# Patient Record
Sex: MALE | Race: WHITE | NOT HISPANIC OR LATINO | Employment: UNEMPLOYED | URBAN - METROPOLITAN AREA
[De-identification: names, ages, dates, MRNs, and addresses within clinical notes are randomized per-mention and may not be internally consistent; named-entity substitution may affect disease eponyms.]

---

## 2023-02-15 PROBLEM — E03.9 ACQUIRED HYPOTHYROIDISM: Status: ACTIVE | Noted: 2023-02-15

## 2023-02-15 PROBLEM — I48.92 ATRIAL FLUTTER (MULTI): Status: ACTIVE | Noted: 2023-02-15

## 2023-02-15 PROBLEM — G47.30 SLEEP APNEA IN ADULT: Status: ACTIVE | Noted: 2023-02-15

## 2023-02-15 PROBLEM — J06.9 ACUTE URI: Status: ACTIVE | Noted: 2023-02-15

## 2023-02-15 PROBLEM — E78.00 HYPERCHOLESTEROLEMIA: Status: ACTIVE | Noted: 2023-02-15

## 2023-02-15 PROBLEM — R06.89 DECREASED BREATH SOUNDS AT LEFT LUNG BASE: Status: ACTIVE | Noted: 2023-02-15

## 2023-02-15 PROBLEM — R35.1 NOCTURIA MORE THAN TWICE PER NIGHT: Status: ACTIVE | Noted: 2023-02-15

## 2023-02-15 PROBLEM — I10 ESSENTIAL HYPERTENSION WITH GOAL BLOOD PRESSURE LESS THAN 130/80: Status: ACTIVE | Noted: 2023-02-15

## 2023-02-15 PROBLEM — M21.612 BUNION OF GREAT TOE OF LEFT FOOT: Status: ACTIVE | Noted: 2023-02-15

## 2023-02-15 PROBLEM — I25.10 CORONARY ARTERY DISEASE INVOLVING NATIVE CORONARY ARTERY OF NATIVE HEART WITHOUT ANGINA PECTORIS: Status: ACTIVE | Noted: 2023-02-15

## 2023-02-15 PROBLEM — Z95.1 S/P CABG X 2: Status: ACTIVE | Noted: 2023-02-15

## 2023-02-15 PROBLEM — R06.83 SNORING: Status: ACTIVE | Noted: 2023-02-15

## 2023-02-15 PROBLEM — T14.90XA TRAUMA, BLUNT: Status: ACTIVE | Noted: 2023-02-15

## 2023-02-15 PROBLEM — R06.81 WITNESSED EPISODE OF APNEA: Status: ACTIVE | Noted: 2023-02-15

## 2023-02-15 PROBLEM — I21.4 NON-Q WAVE NON-ST ELEVATION MYOCARDIAL INFARCTION (NSTEMI) (MULTI): Status: ACTIVE | Noted: 2023-02-15

## 2023-02-15 PROBLEM — G47.33 OSA (OBSTRUCTIVE SLEEP APNEA): Status: ACTIVE | Noted: 2023-02-15

## 2023-02-15 PROBLEM — R97.20 ELEVATED PSA: Status: ACTIVE | Noted: 2023-02-15

## 2023-02-15 PROBLEM — K63.5 COLON POLYPS: Status: ACTIVE | Noted: 2023-02-15

## 2023-02-15 PROBLEM — N52.9 MALE ERECTILE DISORDER OF ORGANIC ORIGIN: Status: ACTIVE | Noted: 2023-02-15

## 2023-02-15 PROBLEM — I48.91 A-FIB (MULTI): Status: ACTIVE | Noted: 2023-02-15

## 2023-02-15 PROBLEM — I42.2 APICAL VARIANT HYPERTROPHIC CARDIOMYOPATHY (MULTI): Status: ACTIVE | Noted: 2023-02-15

## 2023-02-15 RX ORDER — MULTIVITAMIN
1 TABLET ORAL DAILY
COMMUNITY

## 2023-02-15 RX ORDER — LEVOTHYROXINE SODIUM 50 UG/1
1 TABLET ORAL DAILY
COMMUNITY
Start: 2020-11-18 | End: 2023-04-24

## 2023-02-15 RX ORDER — NITROGLYCERIN 0.4 MG/1
0.4 TABLET SUBLINGUAL EVERY 5 MIN PRN
COMMUNITY

## 2023-02-15 RX ORDER — ROSUVASTATIN CALCIUM 40 MG/1
1 TABLET, COATED ORAL DAILY
COMMUNITY
Start: 2014-06-04 | End: 2023-07-20 | Stop reason: WASHOUT

## 2023-02-15 RX ORDER — METOPROLOL TARTRATE 100 MG/1
1 TABLET ORAL 2 TIMES DAILY
COMMUNITY
Start: 2021-06-09

## 2023-03-27 ENCOUNTER — TELEPHONE (OUTPATIENT)
Dept: PRIMARY CARE | Facility: CLINIC | Age: 68
End: 2023-03-27
Payer: COMMERCIAL

## 2023-03-28 ENCOUNTER — APPOINTMENT (OUTPATIENT)
Dept: PRIMARY CARE | Facility: CLINIC | Age: 68
End: 2023-03-28
Payer: COMMERCIAL

## 2023-03-30 ENCOUNTER — PATIENT OUTREACH (OUTPATIENT)
Dept: CARE COORDINATION | Facility: CLINIC | Age: 68
End: 2023-03-30
Payer: COMMERCIAL

## 2023-03-30 ENCOUNTER — DOCUMENTATION (OUTPATIENT)
Dept: CARE COORDINATION | Facility: CLINIC | Age: 68
End: 2023-03-30
Payer: COMMERCIAL

## 2023-04-22 DIAGNOSIS — E03.9 ACQUIRED HYPOTHYROIDISM: Primary | ICD-10-CM

## 2023-04-24 RX ORDER — LEVOTHYROXINE SODIUM 50 UG/1
TABLET ORAL
Qty: 90 TABLET | Refills: 1 | Status: SHIPPED | OUTPATIENT
Start: 2023-04-24 | End: 2023-12-04

## 2023-07-19 PROBLEM — Z20.822 CONTACT WITH AND (SUSPECTED) EXPOSURE TO COVID-19: Status: ACTIVE | Noted: 2023-03-27

## 2023-07-19 PROBLEM — J45.20 MILD INTERMITTENT ASTHMA (HHS-HCC): Status: ACTIVE | Noted: 2023-07-19

## 2023-07-19 PROBLEM — G89.29 CHRONIC PAIN OF RIGHT KNEE: Status: ACTIVE | Noted: 2023-04-27

## 2023-07-19 PROBLEM — R07.9 ACUTE CHEST PAIN: Status: ACTIVE | Noted: 2023-03-27

## 2023-07-19 PROBLEM — M10.9 GOUTY ARTHRITIS OF LEFT FOOT: Status: ACTIVE | Noted: 2023-07-19

## 2023-07-19 PROBLEM — M25.561 CHRONIC PAIN OF RIGHT KNEE: Status: ACTIVE | Noted: 2023-04-27

## 2023-07-19 PROBLEM — M17.11 PRIMARY OSTEOARTHRITIS OF RIGHT KNEE: Status: ACTIVE | Noted: 2023-04-27

## 2023-07-19 PROBLEM — G80.9 CEREBRAL PALSY (MULTI): Status: ACTIVE | Noted: 2023-07-19

## 2023-07-19 PROBLEM — R53.83 FATIGUE: Status: ACTIVE | Noted: 2023-07-19

## 2023-07-19 PROBLEM — S83.231A COMPLEX TEAR OF MEDIAL MENISCUS OF RIGHT KNEE AS CURRENT INJURY: Status: ACTIVE | Noted: 2023-04-27

## 2023-07-19 PROBLEM — S83.231 COMPLEX TEAR OF MEDIAL MENISCUS OF RIGHT KNEE AS CURRENT INJURY: Status: ACTIVE | Noted: 2023-04-27

## 2023-07-19 RX ORDER — ATORVASTATIN CALCIUM 40 MG/1
40 TABLET, FILM COATED ORAL
COMMUNITY
Start: 2016-04-04 | End: 2023-10-18 | Stop reason: ALTCHOICE

## 2023-07-19 RX ORDER — IRBESARTAN AND HYDROCHLOROTHIAZIDE 150; 12.5 MG/1; MG/1
TABLET, FILM COATED ORAL
COMMUNITY
Start: 2017-08-16 | End: 2023-07-20 | Stop reason: WASHOUT

## 2023-07-19 RX ORDER — SILDENAFIL CITRATE 20 MG/1
TABLET ORAL
COMMUNITY
Start: 2017-08-16

## 2023-07-19 RX ORDER — ACETAMINOPHEN 500 MG
500 TABLET ORAL EVERY 6 HOURS PRN
COMMUNITY
Start: 2023-02-02 | End: 2023-07-20 | Stop reason: ALTCHOICE

## 2023-07-20 ENCOUNTER — OFFICE VISIT (OUTPATIENT)
Dept: PRIMARY CARE | Facility: CLINIC | Age: 68
End: 2023-07-20
Payer: COMMERCIAL

## 2023-07-20 ENCOUNTER — LAB (OUTPATIENT)
Dept: LAB | Facility: LAB | Age: 68
End: 2023-07-20
Payer: COMMERCIAL

## 2023-07-20 VITALS
BODY MASS INDEX: 31.07 KG/M2 | OXYGEN SATURATION: 96 % | DIASTOLIC BLOOD PRESSURE: 68 MMHG | SYSTOLIC BLOOD PRESSURE: 114 MMHG | HEIGHT: 69 IN | HEART RATE: 56 BPM | WEIGHT: 209.8 LBS

## 2023-07-20 DIAGNOSIS — R60.0 LOWER EXTREMITY EDEMA: Primary | ICD-10-CM

## 2023-07-20 DIAGNOSIS — R60.0 LOWER EXTREMITY EDEMA: ICD-10-CM

## 2023-07-20 PROCEDURE — 85025 COMPLETE CBC W/AUTO DIFF WBC: CPT

## 2023-07-20 PROCEDURE — 3074F SYST BP LT 130 MM HG: CPT | Performed by: NURSE PRACTITIONER

## 2023-07-20 PROCEDURE — 1159F MED LIST DOCD IN RCRD: CPT | Performed by: NURSE PRACTITIONER

## 2023-07-20 PROCEDURE — 1036F TOBACCO NON-USER: CPT | Performed by: NURSE PRACTITIONER

## 2023-07-20 PROCEDURE — 1126F AMNT PAIN NOTED NONE PRSNT: CPT | Performed by: NURSE PRACTITIONER

## 2023-07-20 PROCEDURE — 85379 FIBRIN DEGRADATION QUANT: CPT

## 2023-07-20 PROCEDURE — 36415 COLL VENOUS BLD VENIPUNCTURE: CPT

## 2023-07-20 PROCEDURE — 3078F DIAST BP <80 MM HG: CPT | Performed by: NURSE PRACTITIONER

## 2023-07-20 PROCEDURE — 83880 ASSAY OF NATRIURETIC PEPTIDE: CPT

## 2023-07-20 PROCEDURE — 80053 COMPREHEN METABOLIC PANEL: CPT

## 2023-07-20 PROCEDURE — 1160F RVW MEDS BY RX/DR IN RCRD: CPT | Performed by: NURSE PRACTITIONER

## 2023-07-20 PROCEDURE — 99213 OFFICE O/P EST LOW 20 MIN: CPT | Performed by: NURSE PRACTITIONER

## 2023-07-20 ASSESSMENT — PAIN SCALES - GENERAL: PAINLEVEL: 0-NO PAIN

## 2023-07-20 ASSESSMENT — ENCOUNTER SYMPTOMS
RESPIRATORY NEGATIVE: 1
CARDIOVASCULAR NEGATIVE: 1
JOINT SWELLING: 0
ARTHRALGIAS: 1
MYALGIAS: 0
GASTROINTESTINAL NEGATIVE: 1
CONSTITUTIONAL NEGATIVE: 1

## 2023-07-20 NOTE — PATIENT INSTRUCTIONS
"Increase fluid- water 64 + ounces a day  Limit sodium  Labs complete today-> let you know results and if normal will sent in small dose of water pill to take \"as needed\" to help decrease swelling  Elevated legs when possible, avoid standing in one place without exercising muscles of legs.   US schedule to rule out possible DVT schedule soon.     "
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
## 2023-07-21 DIAGNOSIS — R79.89 ELEVATED BRAIN NATRIURETIC PEPTIDE (BNP) LEVEL: ICD-10-CM

## 2023-07-21 DIAGNOSIS — R60.0 LOWER EXTREMITY EDEMA: Primary | ICD-10-CM

## 2023-07-21 LAB
ALANINE AMINOTRANSFERASE (SGPT) (U/L) IN SER/PLAS: 26 U/L (ref 10–52)
ALBUMIN (G/DL) IN SER/PLAS: 4.5 G/DL (ref 3.4–5)
ALKALINE PHOSPHATASE (U/L) IN SER/PLAS: 45 U/L (ref 33–136)
ANION GAP IN SER/PLAS: 13 MMOL/L (ref 10–20)
ASPARTATE AMINOTRANSFERASE (SGOT) (U/L) IN SER/PLAS: 25 U/L (ref 9–39)
BASOPHILS (10*3/UL) IN BLOOD BY AUTOMATED COUNT: 0.04 X10E9/L (ref 0–0.1)
BASOPHILS/100 LEUKOCYTES IN BLOOD BY AUTOMATED COUNT: 0.6 % (ref 0–2)
BILIRUBIN TOTAL (MG/DL) IN SER/PLAS: 0.7 MG/DL (ref 0–1.2)
CALCIUM (MG/DL) IN SER/PLAS: 9.4 MG/DL (ref 8.6–10.6)
CARBON DIOXIDE, TOTAL (MMOL/L) IN SER/PLAS: 26 MMOL/L (ref 21–32)
CHLORIDE (MMOL/L) IN SER/PLAS: 109 MMOL/L (ref 98–107)
CREATININE (MG/DL) IN SER/PLAS: 1.11 MG/DL (ref 0.5–1.3)
EOSINOPHILS (10*3/UL) IN BLOOD BY AUTOMATED COUNT: 0.1 X10E9/L (ref 0–0.7)
EOSINOPHILS/100 LEUKOCYTES IN BLOOD BY AUTOMATED COUNT: 1.5 % (ref 0–6)
ERYTHROCYTE DISTRIBUTION WIDTH (RATIO) BY AUTOMATED COUNT: 12.6 % (ref 11.5–14.5)
ERYTHROCYTE MEAN CORPUSCULAR HEMOGLOBIN CONCENTRATION (G/DL) BY AUTOMATED: 31.9 G/DL (ref 32–36)
ERYTHROCYTE MEAN CORPUSCULAR VOLUME (FL) BY AUTOMATED COUNT: 94 FL (ref 80–100)
ERYTHROCYTES (10*6/UL) IN BLOOD BY AUTOMATED COUNT: 5.38 X10E12/L (ref 4.5–5.9)
FIBRIN D-DIMER (NG/ML FEU) IN PLATELET POOR PLASMA: 221 NG/ML FEU
GFR MALE: 73 ML/MIN/1.73M2
GLUCOSE (MG/DL) IN SER/PLAS: 89 MG/DL (ref 74–99)
HEMATOCRIT (%) IN BLOOD BY AUTOMATED COUNT: 50.5 % (ref 41–52)
HEMOGLOBIN (G/DL) IN BLOOD: 16.1 G/DL (ref 13.5–17.5)
IMMATURE GRANULOCYTES/100 LEUKOCYTES IN BLOOD BY AUTOMATED COUNT: 0.3 % (ref 0–0.9)
LEUKOCYTES (10*3/UL) IN BLOOD BY AUTOMATED COUNT: 6.8 X10E9/L (ref 4.4–11.3)
LYMPHOCYTES (10*3/UL) IN BLOOD BY AUTOMATED COUNT: 1.69 X10E9/L (ref 1.2–4.8)
LYMPHOCYTES/100 LEUKOCYTES IN BLOOD BY AUTOMATED COUNT: 25 % (ref 13–44)
MONOCYTES (10*3/UL) IN BLOOD BY AUTOMATED COUNT: 0.57 X10E9/L (ref 0.1–1)
MONOCYTES/100 LEUKOCYTES IN BLOOD BY AUTOMATED COUNT: 8.4 % (ref 2–10)
NATRIURETIC PEPTIDE B (PG/ML) IN SER/PLAS: 162 PG/ML (ref 0–99)
NEUTROPHILS (10*3/UL) IN BLOOD BY AUTOMATED COUNT: 4.34 X10E9/L (ref 1.2–7.7)
NEUTROPHILS/100 LEUKOCYTES IN BLOOD BY AUTOMATED COUNT: 64.2 % (ref 40–80)
NRBC (PER 100 WBCS) BY AUTOMATED COUNT: 0 /100 WBC (ref 0–0)
PLATELETS (10*3/UL) IN BLOOD AUTOMATED COUNT: 201 X10E9/L (ref 150–450)
POTASSIUM (MMOL/L) IN SER/PLAS: 4.8 MMOL/L (ref 3.5–5.3)
PROTEIN TOTAL: 6.8 G/DL (ref 6.4–8.2)
SODIUM (MMOL/L) IN SER/PLAS: 143 MMOL/L (ref 136–145)
UREA NITROGEN (MG/DL) IN SER/PLAS: 20 MG/DL (ref 6–23)

## 2023-07-21 NOTE — RESULT ENCOUNTER NOTE
Labs overall were not worrisome for blood clot.   Did have a just slightly elevated lab for decreased functioning of the heart pumping, which one of the symptoms is swelling in the legs. I reviewed your labs and case with Dr. Saleh and he recommended an echocardiogram - which I ordered and begin to wear compression stockings for likely venous insufficieny. Your can buy them over the counter (travel socks) or I can send in prescription compression socks that can be bought at [employer] or similar medical supply stores, they are measured to fit appropriately.

## 2023-08-01 ENCOUNTER — OFFICE VISIT (OUTPATIENT)
Dept: PRIMARY CARE | Facility: CLINIC | Age: 68
End: 2023-08-01
Payer: COMMERCIAL

## 2023-08-01 VITALS
WEIGHT: 206 LBS | OXYGEN SATURATION: 94 % | DIASTOLIC BLOOD PRESSURE: 66 MMHG | HEART RATE: 62 BPM | SYSTOLIC BLOOD PRESSURE: 124 MMHG | BODY MASS INDEX: 30.51 KG/M2 | HEIGHT: 69 IN

## 2023-08-01 DIAGNOSIS — J42 CHRONIC BRONCHITIS, UNSPECIFIED CHRONIC BRONCHITIS TYPE (MULTI): Primary | ICD-10-CM

## 2023-08-01 DIAGNOSIS — I25.10 CORONARY ARTERY DISEASE INVOLVING NATIVE CORONARY ARTERY OF NATIVE HEART WITHOUT ANGINA PECTORIS: ICD-10-CM

## 2023-08-01 DIAGNOSIS — R60.0 LOWER EXTREMITY EDEMA: ICD-10-CM

## 2023-08-01 PROBLEM — R53.83 FATIGUE: Status: RESOLVED | Noted: 2023-07-19 | Resolved: 2023-08-01

## 2023-08-01 PROBLEM — J06.9 ACUTE URI: Status: RESOLVED | Noted: 2023-02-15 | Resolved: 2023-08-01

## 2023-08-01 PROBLEM — Z20.822 CONTACT WITH AND (SUSPECTED) EXPOSURE TO COVID-19: Status: RESOLVED | Noted: 2023-03-27 | Resolved: 2023-08-01

## 2023-08-01 PROBLEM — R07.9 ACUTE CHEST PAIN: Status: RESOLVED | Noted: 2023-03-27 | Resolved: 2023-08-01

## 2023-08-01 PROCEDURE — 1036F TOBACCO NON-USER: CPT | Performed by: INTERNAL MEDICINE

## 2023-08-01 PROCEDURE — 1126F AMNT PAIN NOTED NONE PRSNT: CPT | Performed by: INTERNAL MEDICINE

## 2023-08-01 PROCEDURE — 3078F DIAST BP <80 MM HG: CPT | Performed by: INTERNAL MEDICINE

## 2023-08-01 PROCEDURE — 3074F SYST BP LT 130 MM HG: CPT | Performed by: INTERNAL MEDICINE

## 2023-08-01 PROCEDURE — 1160F RVW MEDS BY RX/DR IN RCRD: CPT | Performed by: INTERNAL MEDICINE

## 2023-08-01 PROCEDURE — 1159F MED LIST DOCD IN RCRD: CPT | Performed by: INTERNAL MEDICINE

## 2023-08-01 PROCEDURE — 99214 OFFICE O/P EST MOD 30 MIN: CPT | Performed by: INTERNAL MEDICINE

## 2023-08-01 RX ORDER — CLOMIPHENE CITRATE 50 MG/1
50 TABLET ORAL DAILY
COMMUNITY
Start: 2023-07-19 | End: 2023-10-18 | Stop reason: ALTCHOICE

## 2023-08-01 ASSESSMENT — ENCOUNTER SYMPTOMS
SHORTNESS OF BREATH: 0
FATIGUE: 0
DIZZINESS: 0

## 2023-08-01 NOTE — ASSESSMENT & PLAN NOTE
Leg swelling improved.  Limit sodium to < 2,000 mg daily.  Rec compression stockings, 15-20 mmHg.  Consider vascular consult.

## 2023-08-01 NOTE — PROGRESS NOTES
"Subjective   Patient ID: Jhony Beyer is a 67 y.o. male who presents for Follow-up chronic medical problems.    Swelling in legs improved since last ov.  Dines out 4 times a week.  ? Salt intake.  US negative for DVT.  Echo pending.  Borderline BNP, no clinical findings.  Peloton bike, 6 miles in 20 minutes.  Light weights every other day.  No CP or SOB.  Scheduled for cardiac ablation 9-6-23, afib/flutter.  May have TKR this fall.       Review of Systems   Constitutional:  Negative for fatigue.   Respiratory:  Negative for shortness of breath.    Cardiovascular:  Positive for leg swelling. Negative for chest pain.   Musculoskeletal:         Knee pain.   Neurological:  Negative for dizziness.       Objective   /66 (BP Location: Right arm, Patient Position: Sitting)   Pulse 62   Ht 1.753 m (5' 9\")   Wt 93.4 kg (206 lb)   SpO2 94%   BMI 30.42 kg/m²     Physical Exam  Constitutional:       Appearance: Normal appearance.   Cardiovascular:      Rate and Rhythm: Normal rate and regular rhythm.      Pulses: Normal pulses.      Heart sounds: Normal heart sounds.   Pulmonary:      Effort: Pulmonary effort is normal.      Breath sounds: Normal breath sounds.   Musculoskeletal:         General: Swelling present.   Neurological:      General: No focal deficit present.      Mental Status: He is alert.   Psychiatric:         Mood and Affect: Mood normal.         Behavior: Behavior normal.         Thought Content: Thought content normal.         Judgment: Judgment normal.         Assessment/Plan   Problem List Items Addressed This Visit          Cardiac and Vasculature    Coronary artery disease involving native coronary artery of native heart without angina pectoris     Schedule echo, fu borderline Bnp.            Pulmonary and Pneumonias    Chronic bronchitis, unspecified chronic bronchitis type (CMS/HCC) - Primary     Seasonal cough, no pulmonary meds.            Symptoms and Signs    Lower extremity edema     Leg " swelling improved.  Limit sodium to < 2,000 mg daily.  Rec compression stockings, 15-20 mmHg.  Consider vascular consult.            Time Spent  Prep time on day of patient encounter: 4 minutes  Time spent directly with patient, family or caregiver: 25 minutes  Additional Time Spent on Patient Care Activities: 0 minutes  Documentation Time: 2 minutes  Other Time Spent: 0 minutes  Total: 31 minutes

## 2023-09-06 ENCOUNTER — HOSPITAL ENCOUNTER (OUTPATIENT)
Dept: DATA CONVERSION | Facility: HOSPITAL | Age: 68
End: 2023-09-06
Attending: INTERNAL MEDICINE | Admitting: INTERNAL MEDICINE

## 2023-09-06 ENCOUNTER — APPOINTMENT (OUTPATIENT)
Dept: PRIMARY CARE | Facility: CLINIC | Age: 68
End: 2023-09-06
Payer: COMMERCIAL

## 2023-09-06 DIAGNOSIS — I48.92 UNSPECIFIED ATRIAL FLUTTER (MULTI): ICD-10-CM

## 2023-09-30 NOTE — H&P
History of Present Illness:   History Present Illness:  Reason for surgery: Atrial flutter   HPI:    This is a 68 year old male with a PMH significant for CAD - s/p CABG  x 2., Apical hypertrophic cardiomyopathy, Hypertension, post cardiac surgery AF and typical atrial flutter  presenting for RFA.     Allergies:        Allergies:  ·  No Known Allergies :     Home Medication Review:   Home Medications Reviewed: yes     Impression/Procedure:   ·  Impression and Planned Procedure: Typical AFL presenting for RFA.       ERAS (Enhanced Recovery After Surgery):  ·  ERAS Patient: no       Physical Exam by System:    Constitutional: Well developed, awake/alert/oriented  x3, no distress, alert and cooperative   Respiratory/Thorax: Patent airways, normal breath  sounds with good chest expansion, thorax symmetric   Cardiovascular: Regular, 2+ equal pulses of the extremities,   Gastrointestinal: Nondistended, soft, non-tender   Neurological: alert and oriented x3   Skin: Warm and dry, no lesions, no rashes     Consent:   COVID-19 Consent:  ·  COVID-19 Risk Consent Surgeon has reviewed key risks related to the risk of charlie COVID-19 and if they contract COVID-19 what the risks are.     Attestation:   Note Completion:  I am a:  Resident/Fellow   Attending Attestation I saw and evaluated the patient.  I personally obtained the key and critical portions of the history and physical exam or was physically present for key and  critical portions performed by the resident/fellow. I reviewed the resident/fellow?s documentation and discussed the patient with the resident/fellow.  I agree with the resident/fellow?s medical decision making as documented in the note.     I personally evaluated the patient on 06-Sep-2023         Electronic Signatures:  Terence Packer (Fellow))  (Signed 06-Sep-2023 13:06)   Authored: History of Present Illness, Allergies, Home  Medication Review, Impression/Procedure, ERAS, Physical Exam, Consent,  Note Completion  Adebayo Lezama)  (Signed 11-Sep-2023 10:13)   Authored: Note Completion   Co-Signer: History of Present Illness, Allergies, Home Medication Review, Impression/Procedure, ERAS, Physical Exam, Consent, Note Completion      Last Updated: 11-Sep-2023 10:13 by Adebayo Lezama)

## 2023-10-12 RX ORDER — GLUCOSAMINE/CHONDR SU A SOD 167-133 MG
167 CAPSULE ORAL DAILY
COMMUNITY
End: 2023-10-18 | Stop reason: ALTCHOICE

## 2023-10-12 RX ORDER — ROSUVASTATIN CALCIUM 20 MG/1
20 TABLET, COATED ORAL DAILY
COMMUNITY
Start: 2019-01-08 | End: 2024-04-11 | Stop reason: WASHOUT

## 2023-10-12 RX ORDER — ASPIRIN 81 MG/1
1 TABLET ORAL DAILY
COMMUNITY
Start: 2016-11-30 | End: 2023-10-16 | Stop reason: ALTCHOICE

## 2023-10-12 RX ORDER — TADALAFIL 20 MG/1
1 TABLET ORAL DAILY
COMMUNITY
End: 2023-10-18 | Stop reason: ALTCHOICE

## 2023-10-12 RX ORDER — IRBESARTAN AND HYDROCHLOROTHIAZIDE 150; 12.5 MG/1; MG/1
1 TABLET, FILM COATED ORAL DAILY
COMMUNITY
Start: 2017-11-30 | End: 2023-10-18 | Stop reason: ALTCHOICE

## 2023-10-16 ENCOUNTER — OFFICE VISIT (OUTPATIENT)
Dept: CARDIOLOGY | Facility: CLINIC | Age: 68
End: 2023-10-16
Payer: COMMERCIAL

## 2023-10-16 VITALS
SYSTOLIC BLOOD PRESSURE: 138 MMHG | WEIGHT: 207 LBS | HEIGHT: 68 IN | OXYGEN SATURATION: 93 % | DIASTOLIC BLOOD PRESSURE: 73 MMHG | BODY MASS INDEX: 31.37 KG/M2 | HEART RATE: 63 BPM

## 2023-10-16 DIAGNOSIS — I48.92 ATRIAL FLUTTER, UNSPECIFIED TYPE (MULTI): Primary | ICD-10-CM

## 2023-10-16 PROCEDURE — 3075F SYST BP GE 130 - 139MM HG: CPT | Performed by: NURSE PRACTITIONER

## 2023-10-16 PROCEDURE — 1036F TOBACCO NON-USER: CPT | Performed by: NURSE PRACTITIONER

## 2023-10-16 PROCEDURE — 1160F RVW MEDS BY RX/DR IN RCRD: CPT | Performed by: NURSE PRACTITIONER

## 2023-10-16 PROCEDURE — 1126F AMNT PAIN NOTED NONE PRSNT: CPT | Performed by: NURSE PRACTITIONER

## 2023-10-16 PROCEDURE — 99214 OFFICE O/P EST MOD 30 MIN: CPT | Performed by: NURSE PRACTITIONER

## 2023-10-16 PROCEDURE — 1159F MED LIST DOCD IN RCRD: CPT | Performed by: NURSE PRACTITIONER

## 2023-10-16 PROCEDURE — 93010 ELECTROCARDIOGRAM REPORT: CPT | Performed by: INTERNAL MEDICINE

## 2023-10-16 PROCEDURE — 3078F DIAST BP <80 MM HG: CPT | Performed by: NURSE PRACTITIONER

## 2023-10-16 PROCEDURE — 93005 ELECTROCARDIOGRAM TRACING: CPT | Performed by: NURSE PRACTITIONER

## 2023-10-16 ASSESSMENT — ENCOUNTER SYMPTOMS
LOSS OF SENSATION IN FEET: 0
OCCASIONAL FEELINGS OF UNSTEADINESS: 1
DEPRESSION: 0

## 2023-10-16 ASSESSMENT — COLUMBIA-SUICIDE SEVERITY RATING SCALE - C-SSRS
2. HAVE YOU ACTUALLY HAD ANY THOUGHTS OF KILLING YOURSELF?: NO
6. HAVE YOU EVER DONE ANYTHING, STARTED TO DO ANYTHING, OR PREPARED TO DO ANYTHING TO END YOUR LIFE?: NO
1. IN THE PAST MONTH, HAVE YOU WISHED YOU WERE DEAD OR WISHED YOU COULD GO TO SLEEP AND NOT WAKE UP?: NO

## 2023-10-16 ASSESSMENT — PATIENT HEALTH QUESTIONNAIRE - PHQ9
1. LITTLE INTEREST OR PLEASURE IN DOING THINGS: NOT AT ALL
2. FEELING DOWN, DEPRESSED OR HOPELESS: NOT AT ALL
SUM OF ALL RESPONSES TO PHQ9 QUESTIONS 1 AND 2: 0

## 2023-10-16 ASSESSMENT — PAIN SCALES - GENERAL: PAINLEVEL: 0-NO PAIN

## 2023-10-18 ASSESSMENT — ENCOUNTER SYMPTOMS
MYALGIAS: 0
IRREGULAR HEARTBEAT: 0
PND: 0
SHORTNESS OF BREATH: 0
SYNCOPE: 0
VOMITING: 0
SPUTUM PRODUCTION: 0
NEAR-SYNCOPE: 0
SNORING: 0
LIGHT-HEADEDNESS: 0
NAUSEA: 0
PALPITATIONS: 0
WEAKNESS: 0
DIZZINESS: 0
DOUBLE VISION: 0
HEMOPTYSIS: 0
BLURRED VISION: 0
DIAPHORESIS: 0
HEADACHES: 0
FEVER: 0
ABDOMINAL PAIN: 0
FALLS: 0
COUGH: 0
ORTHOPNEA: 0
DYSPNEA ON EXERTION: 0
SORE THROAT: 0
DIARRHEA: 0

## 2023-10-18 NOTE — PROGRESS NOTES
Subjective   Jhony Beyer is a 68 y.o. male.      Jhony Beyer is a 68 year-old with    1. CAD - s/p CABG - 05/22/2020 Dr Alaniz internal thoracic artery to the LAD and in situ right internal thoracic artery   through the transverse sinus to the ramus intermedius.  2. Apical hypertrophic cardiomyopathy  3. Hypertension  4. AF - he had it post-operatively. Stopped Eliquis in October of 2019. He started having palpitations, couple of times per month. Lasting several minutes. He was recently in [facility] admitted with chest pain. During Stress ECHO he had brief episode of atrial flutter. Was reinitiated on Eliquis.    Apr 2020: We discussed with him and his wife the atrial flutter and the atrial fibrillation. I did explain in details the procedure. It is critical that we obtain the rhythm strips from his symptoms as we have to decide if they correlate with any abnormal rhythm, and if they do if he will need PVI with RFA of atrial flutter or just RFA of atrial flutter. He will call me to review the data. If the monitor is non - contributory I did recommend [employer].    TESTING:    -cMRI: (Sept 2019) LVEF 70%. Apical hypertrophy, so significant scar burden.    -StressEcho: (Apr 2020) Resting EF 55-60% and 65-70% at peak exercise. ECG tracing nondiagnostic due to LVH. SVT and AFL noted during peak exercise with spontaneous conversion to NSR approx 6 min into recovery. No evidence of ischemia on ECG tracings or echo imaging.    -Nuclear Stress Lexiscan: (Mar 2023) No ischemia. LVEF 51%.   -MONITOR: (Mar-Apr 2023) [employer]ice 30 days. Showed 4 episodes of nonsustained VT longest run lasting 7 beats.   Pt triggered events correlated with NSR. Some 1st degree AVB noted. SVE (Anderson <1%) and VE (Anderson <1%)     Now s/p CTI RFA 9/7/2023 with Dr. Lezama   ECG 10/16/2023 NSR HR 63 bpm    TODAY patient presents for 1 month follow-up post a flutter ablation.  He is doing well, and denies any cardiac complaints.  He had a little  fluttering now and then the first week post ablation, however he now denies any palpitations, chest pain, shortness of breath, lower extremity edema, orthopnea and syncope.    Atrial Fibrillation  Symptoms are negative for chest pain, dizziness, palpitations, shortness of breath, syncope and weakness. Past medical history includes atrial fibrillation.       Review of Systems   Constitutional: Negative for diaphoresis, fever and malaise/fatigue.   HENT:  Negative for congestion and sore throat.    Eyes:  Negative for blurred vision and double vision.   Cardiovascular:  Negative for chest pain, dyspnea on exertion, irregular heartbeat, leg swelling, near-syncope, orthopnea, palpitations, paroxysmal nocturnal dyspnea and syncope.   Respiratory:  Negative for cough, hemoptysis, shortness of breath, snoring and sputum production.    Hematologic/Lymphatic: Negative for bleeding problem.   Skin:  Negative for rash.   Musculoskeletal:  Negative for falls, joint pain and myalgias.   Gastrointestinal:  Negative for abdominal pain, diarrhea, nausea and vomiting.   Neurological:  Negative for dizziness, headaches, light-headedness and weakness.   All other systems reviewed and are negative.      Objective   Constitutional:       Appearance: Healthy appearance. Not in distress.   Eyes:      Conjunctiva/sclera: Conjunctivae normal.   HENT:      Nose: Nose normal.    Mouth/Throat:      Pharynx: Oropharynx is clear.   Neck:      Vascular: No JVR. JVD normal.   Pulmonary:      Effort: Pulmonary effort is normal.      Breath sounds: Normal breath sounds. No wheezing. No rhonchi.   Chest:      Chest wall: Not tender to palpatation.   Cardiovascular:      Normal rate. Regular rhythm.      Murmurs: There is no murmur.      No rub.   Pulses:     Intact distal pulses.   Edema:     Peripheral edema absent.   Abdominal:      General: Bowel sounds are normal.      Palpations: Abdomen is soft.   Musculoskeletal: Normal range of motion.       Cervical back: Neck supple. Skin:     General: Skin is warm and dry.      Comments: Right groin site well approximated, no bruising/bleeding/swelling/redness/pain   Neurological:      Mental Status: Alert and oriented to person, place and time.      Motor: Motor function is intact.         Assessment/Plan   The encounter diagnosis was Atrial flutter, unspecified type (CMS/HCC).  Patient is doing well 1 month post a flutter ablation.  Denies any complications or arrhythmia symptoms since.  Patient educated on symptoms that require an office visit or an emergency room visit.  All questions asked and answered.  No activity restrictions    Continue current medications, follow-up with me in 2 months or sooner as needed

## 2023-10-28 LAB
ATRIAL RATE: 63 BPM
P AXIS: 56 DEGREES
P OFFSET: 181 MS
P ONSET: 123 MS
PR INTERVAL: 186 MS
Q ONSET: 216 MS
QRS COUNT: 10 BEATS
QRS DURATION: 82 MS
QT INTERVAL: 454 MS
QTC CALCULATION(BAZETT): 464 MS
QTC FREDERICIA: 461 MS
R AXIS: 2 DEGREES
T AXIS: 143 DEGREES
T OFFSET: 443 MS
VENTRICULAR RATE: 63 BPM

## 2023-10-30 ENCOUNTER — ANCILLARY PROCEDURE (OUTPATIENT)
Dept: RADIOLOGY | Facility: CLINIC | Age: 68
End: 2023-10-30
Payer: COMMERCIAL

## 2023-10-30 ENCOUNTER — OFFICE VISIT (OUTPATIENT)
Dept: PRIMARY CARE | Facility: CLINIC | Age: 68
End: 2023-10-30
Payer: COMMERCIAL

## 2023-10-30 ENCOUNTER — LAB (OUTPATIENT)
Dept: LAB | Facility: LAB | Age: 68
End: 2023-10-30
Payer: COMMERCIAL

## 2023-10-30 VITALS
WEIGHT: 208.4 LBS | HEIGHT: 68 IN | BODY MASS INDEX: 31.58 KG/M2 | HEART RATE: 74 BPM | OXYGEN SATURATION: 97 % | SYSTOLIC BLOOD PRESSURE: 148 MMHG | DIASTOLIC BLOOD PRESSURE: 98 MMHG

## 2023-10-30 DIAGNOSIS — G47.30 SLEEP APNEA IN ADULT: ICD-10-CM

## 2023-10-30 DIAGNOSIS — I25.10 CORONARY ARTERY DISEASE INVOLVING NATIVE CORONARY ARTERY OF NATIVE HEART WITHOUT ANGINA PECTORIS: ICD-10-CM

## 2023-10-30 DIAGNOSIS — J45.20 MILD INTERMITTENT ASTHMA, UNSPECIFIED WHETHER COMPLICATED (HHS-HCC): ICD-10-CM

## 2023-10-30 DIAGNOSIS — I10 ESSENTIAL HYPERTENSION WITH GOAL BLOOD PRESSURE LESS THAN 130/80: ICD-10-CM

## 2023-10-30 DIAGNOSIS — I48.92 ATRIAL FLUTTER, UNSPECIFIED TYPE (MULTI): ICD-10-CM

## 2023-10-30 DIAGNOSIS — E03.9 ACQUIRED HYPOTHYROIDISM: ICD-10-CM

## 2023-10-30 DIAGNOSIS — Z01.818 PREOPERATIVE CLEARANCE: Primary | ICD-10-CM

## 2023-10-30 DIAGNOSIS — M10.9 GOUTY ARTHRITIS OF LEFT FOOT: ICD-10-CM

## 2023-10-30 DIAGNOSIS — E78.00 HYPERCHOLESTEROLEMIA: ICD-10-CM

## 2023-10-30 DIAGNOSIS — Z01.818 PREOPERATIVE CLEARANCE: ICD-10-CM

## 2023-10-30 DIAGNOSIS — M17.11 PRIMARY OSTEOARTHRITIS OF RIGHT KNEE: ICD-10-CM

## 2023-10-30 LAB
APPEARANCE UR: CLEAR
APTT PPP: 31 SECONDS (ref 27–38)
BASOPHILS # BLD AUTO: 0.05 X10*3/UL (ref 0–0.1)
BASOPHILS NFR BLD AUTO: 0.6 %
BILIRUB UR STRIP.AUTO-MCNC: NEGATIVE MG/DL
COLOR UR: YELLOW
EOSINOPHIL # BLD AUTO: 0.09 X10*3/UL (ref 0–0.7)
EOSINOPHIL NFR BLD AUTO: 1.1 %
ERYTHROCYTE [DISTWIDTH] IN BLOOD BY AUTOMATED COUNT: 12.6 % (ref 11.5–14.5)
GLUCOSE UR STRIP.AUTO-MCNC: NEGATIVE MG/DL
HCT VFR BLD AUTO: 51.6 % (ref 41–52)
HGB BLD-MCNC: 17.3 G/DL (ref 13.5–17.5)
IMM GRANULOCYTES # BLD AUTO: 0.02 X10*3/UL (ref 0–0.7)
IMM GRANULOCYTES NFR BLD AUTO: 0.3 % (ref 0–0.9)
INR PPP: 1 (ref 0.9–1.1)
KETONES UR STRIP.AUTO-MCNC: NEGATIVE MG/DL
LEUKOCYTE ESTERASE UR QL STRIP.AUTO: NEGATIVE
LYMPHOCYTES # BLD AUTO: 1.77 X10*3/UL (ref 1.2–4.8)
LYMPHOCYTES NFR BLD AUTO: 22.4 %
MCH RBC QN AUTO: 30.9 PG (ref 26–34)
MCHC RBC AUTO-ENTMCNC: 33.5 G/DL (ref 32–36)
MCV RBC AUTO: 92 FL (ref 80–100)
MONOCYTES # BLD AUTO: 0.79 X10*3/UL (ref 0.1–1)
MONOCYTES NFR BLD AUTO: 10 %
MUCOUS THREADS #/AREA URNS AUTO: ABNORMAL /LPF
NEUTROPHILS # BLD AUTO: 5.18 X10*3/UL (ref 1.2–7.7)
NEUTROPHILS NFR BLD AUTO: 65.6 %
NITRITE UR QL STRIP.AUTO: NEGATIVE
NRBC BLD-RTO: 0 /100 WBCS (ref 0–0)
PH UR STRIP.AUTO: 5 [PH]
PLATELET # BLD AUTO: 207 X10*3/UL (ref 150–450)
PMV BLD AUTO: 10.2 FL (ref 7.5–11.5)
PROT UR STRIP.AUTO-MCNC: NEGATIVE MG/DL
PROTHROMBIN TIME: 11.1 SECONDS (ref 9.8–12.8)
RBC # BLD AUTO: 5.6 X10*6/UL (ref 4.5–5.9)
RBC # UR STRIP.AUTO: ABNORMAL /UL
RBC #/AREA URNS AUTO: ABNORMAL /HPF
SP GR UR STRIP.AUTO: 1.01
UROBILINOGEN UR STRIP.AUTO-MCNC: <2 MG/DL
WBC # BLD AUTO: 7.9 X10*3/UL (ref 4.4–11.3)
WBC #/AREA URNS AUTO: ABNORMAL /HPF

## 2023-10-30 PROCEDURE — 85025 COMPLETE CBC W/AUTO DIFF WBC: CPT

## 2023-10-30 PROCEDURE — 71046 X-RAY EXAM CHEST 2 VIEWS: CPT

## 2023-10-30 PROCEDURE — 99214 OFFICE O/P EST MOD 30 MIN: CPT | Performed by: NURSE PRACTITIONER

## 2023-10-30 PROCEDURE — 1126F AMNT PAIN NOTED NONE PRSNT: CPT | Performed by: NURSE PRACTITIONER

## 2023-10-30 PROCEDURE — 71046 X-RAY EXAM CHEST 2 VIEWS: CPT | Performed by: RADIOLOGY

## 2023-10-30 PROCEDURE — 1036F TOBACCO NON-USER: CPT | Performed by: NURSE PRACTITIONER

## 2023-10-30 PROCEDURE — 1160F RVW MEDS BY RX/DR IN RCRD: CPT | Performed by: NURSE PRACTITIONER

## 2023-10-30 PROCEDURE — 85610 PROTHROMBIN TIME: CPT

## 2023-10-30 PROCEDURE — 3077F SYST BP >= 140 MM HG: CPT | Performed by: NURSE PRACTITIONER

## 2023-10-30 PROCEDURE — 36415 COLL VENOUS BLD VENIPUNCTURE: CPT

## 2023-10-30 PROCEDURE — 1159F MED LIST DOCD IN RCRD: CPT | Performed by: NURSE PRACTITIONER

## 2023-10-30 PROCEDURE — 81001 URINALYSIS AUTO W/SCOPE: CPT

## 2023-10-30 PROCEDURE — 85730 THROMBOPLASTIN TIME PARTIAL: CPT

## 2023-10-30 PROCEDURE — 3080F DIAST BP >= 90 MM HG: CPT | Performed by: NURSE PRACTITIONER

## 2023-10-30 ASSESSMENT — ENCOUNTER SYMPTOMS
PSYCHIATRIC NEGATIVE: 1
MUSCULOSKELETAL NEGATIVE: 1
HEMATOLOGIC/LYMPHATIC NEGATIVE: 1
NEUROLOGICAL NEGATIVE: 1
ALLERGIC/IMMUNOLOGIC NEGATIVE: 1
CARDIOVASCULAR NEGATIVE: 1
ENDOCRINE NEGATIVE: 1
CONSTITUTIONAL NEGATIVE: 1
RESPIRATORY NEGATIVE: 1
GASTROINTESTINAL NEGATIVE: 1

## 2023-10-30 NOTE — PROGRESS NOTES
"Subjective   Patient ID: Jhony Beyer is a 68 y.o. male who presents for Pre-op Exam (Pre op clearance right knee on 11/6/23 with dr Mars. With [facility]. ).    HPI   Patient of Dr Saleh here for preop clearance total right knee on11/06/2023 with [facility] with Dr Abhilash Mars   Patient reports he has received cardiac clearance from his cardiologist Dr. Josh Garcia- not noted in EMR,   Current concerns: NONE   Chronic concerns: Afib, Atrial flutter, CAD, Hypothyroid, Gouty arthritis, KORI- no CPAP, OA.   Specialist  - orthopedic Specialist Dr Jerad MARTÍNEZ orthopedic [facility]   - Cardiology- Dr Josh Garcia,/ Dr Lezama  Labs : 07/20/2023 (acute visit)    Review of Systems   Constitutional: Negative.    HENT: Negative.     Eyes:         Glasses/ contacts   Respiratory: Negative.     Cardiovascular: Negative.    Gastrointestinal: Negative.    Endocrine: Negative.    Genitourinary: Negative.    Musculoskeletal: Negative.    Skin: Negative.    Allergic/Immunologic: Negative.    Neurological: Negative.    Hematological: Negative.    Psychiatric/Behavioral: Negative.       Objective   BP (!) 148/98 (BP Location: Right arm, Patient Position: Sitting, BP Cuff Size: Large adult)   Pulse 74   Ht 1.727 m (5' 8\")   Wt 94.5 kg (208 lb 6.4 oz)   SpO2 97%   BMI 31.69 kg/m²   Forgot take medications this morning.     Physical Exam  Vitals reviewed.   Constitutional:       Appearance: He is obese.   HENT:      Right Ear: Tympanic membrane and ear canal normal.      Left Ear: Tympanic membrane and ear canal normal.      Nose: Nose normal.      Mouth/Throat:      Lips: Pink.      Mouth: Mucous membranes are moist.      Pharynx: Oropharynx is clear.   Eyes:      General: Lids are normal.      Extraocular Movements: Extraocular movements intact.      Conjunctiva/sclera: Conjunctivae normal.   Neck:      Thyroid: No thyromegaly.      Vascular: No carotid bruit.   Cardiovascular:      Rate and " Rhythm: Normal rate and regular rhythm.      Heart sounds: Normal heart sounds.   Pulmonary:      Effort: Pulmonary effort is normal.      Breath sounds: Normal breath sounds. No decreased breath sounds.   Abdominal:      Palpations: Abdomen is soft.      Tenderness: There is no abdominal tenderness.   Musculoskeletal:      Cervical back: Normal range of motion.      Right lower leg: No edema.      Left lower leg: No edema.   Lymphadenopathy:      Cervical: No cervical adenopathy.   Skin:     General: Skin is warm.   Neurological:      Mental Status: He is alert.      Cranial Nerves: Cranial nerves 2-12 are intact.      Motor: Motor function is intact.   Psychiatric:         Attention and Perception: Attention normal.         Speech: Speech normal.         Behavior: Behavior is cooperative.         Thought Content: Thought content normal.         Cognition and Memory: Cognition normal.         Judgment: Judgment normal.       Assessment/Plan   Diagnoses and all orders for this visit:  Preoperative clearance  -     XR chest 2 views; Future  -     CBC and Auto Differential; Future  -     Protime-INR; Future  -     APTT; Future  -     Urinalysis with Reflex Microscopic and Culture; Future  Atrial flutter, unspecified type (CMS/HCC)  Coronary artery disease involving native coronary artery of native heart without angina pectoris  -     XR chest 2 views; Future  -     CBC and Auto Differential; Future  Essential hypertension with goal blood pressure less than 130/80- BP today not within recommended range, patient is monitor BP and contact office regarding readings at home.   -     CBC and Auto Differential; Future  Mild intermittent asthma, unspecified whether complicated  Gouty arthritis of left foot  Sleep apnea in adult  Hypercholesterolemia  Acquired hypothyroidism  Primary osteoarthritis of right knee    PLAN: follow up    Labs and chest x-ray order in place-> complete today.   Message sent directly to Dr Moreno  Jose requesting information regarding cardiac clearance and direction on holding Eliquis ?

## 2023-10-31 DIAGNOSIS — Z01.818 PREOPERATIVE CLEARANCE: Primary | ICD-10-CM

## 2023-10-31 DIAGNOSIS — I10 ESSENTIAL HYPERTENSION WITH GOAL BLOOD PRESSURE LESS THAN 130/80: ICD-10-CM

## 2023-11-01 ENCOUNTER — LAB (OUTPATIENT)
Dept: LAB | Facility: LAB | Age: 68
End: 2023-11-01
Payer: COMMERCIAL

## 2023-11-01 ENCOUNTER — DOCUMENTATION (OUTPATIENT)
Dept: PRIMARY CARE | Facility: CLINIC | Age: 68
End: 2023-11-01

## 2023-11-01 DIAGNOSIS — Z01.818 PREOPERATIVE CLEARANCE: ICD-10-CM

## 2023-11-01 DIAGNOSIS — I10 ESSENTIAL HYPERTENSION WITH GOAL BLOOD PRESSURE LESS THAN 130/80: ICD-10-CM

## 2023-11-01 LAB
ANION GAP SERPL CALC-SCNC: 13 MMOL/L (ref 10–20)
BUN SERPL-MCNC: 17 MG/DL (ref 6–23)
CALCIUM SERPL-MCNC: 8.8 MG/DL (ref 8.6–10.3)
CHLORIDE SERPL-SCNC: 109 MMOL/L (ref 98–107)
CO2 SERPL-SCNC: 25 MMOL/L (ref 21–32)
CREAT SERPL-MCNC: 1 MG/DL (ref 0.5–1.3)
GFR SERPL CREATININE-BSD FRML MDRD: 83 ML/MIN/1.73M*2
GLUCOSE SERPL-MCNC: 94 MG/DL (ref 74–99)
POTASSIUM SERPL-SCNC: 4.4 MMOL/L (ref 3.5–5.3)
SODIUM SERPL-SCNC: 143 MMOL/L (ref 136–145)

## 2023-11-01 PROCEDURE — 80048 BASIC METABOLIC PNL TOTAL CA: CPT

## 2023-11-01 PROCEDURE — 36415 COLL VENOUS BLD VENIPUNCTURE: CPT

## 2023-11-01 NOTE — LETTER
November 1, 2023     Patient: Jhony Beyer   YOB: 1955   Date of Visit: 10/30/2023       Dear Dr. Abhilash Mars    Our mutual patient, Jhony Beyer was seen on 10/30/2023 for surgical clearance. Please see the last office note, the cardiac clearance communication from Dr. Josh Garcia (including his last office note). The patient was provided instruction on holding his Eliquis (hold 48 hours prior to surgery). Most recent EKG, labs (CBC+d, BMP, PT, PTT, INR, urinalysis with reflex) and chest x-ray results    This patient is medically cleared to proceed with joint replacement as scheduled with you on 11/06/2023.  If you have questions, please do not hesitate to call me.      Sincerely,       LIAM Roman-CNP          error

## 2023-11-05 DIAGNOSIS — I48.0 PAROXYSMAL ATRIAL FIBRILLATION (MULTI): Primary | ICD-10-CM

## 2023-11-29 ENCOUNTER — EVALUATION (OUTPATIENT)
Dept: PHYSICAL THERAPY | Facility: HOSPITAL | Age: 68
End: 2023-11-29
Payer: COMMERCIAL

## 2023-11-29 DIAGNOSIS — M25.561 RIGHT KNEE PAIN: Primary | ICD-10-CM

## 2023-11-29 DIAGNOSIS — R26.2 DIFFICULTY WALKING: ICD-10-CM

## 2023-11-29 DIAGNOSIS — Z96.651 STATUS POST RIGHT PARTIAL KNEE REPLACEMENT: ICD-10-CM

## 2023-11-29 DIAGNOSIS — Z47.89 ORTHOPEDIC AFTERCARE: ICD-10-CM

## 2023-11-29 DIAGNOSIS — M25.661 KNEE STIFFNESS, RIGHT: ICD-10-CM

## 2023-11-29 PROCEDURE — 97161 PT EVAL LOW COMPLEX 20 MIN: CPT | Mod: GP | Performed by: PHYSICAL THERAPIST

## 2023-11-29 PROCEDURE — 97110 THERAPEUTIC EXERCISES: CPT | Mod: GP | Performed by: PHYSICAL THERAPIST

## 2023-11-29 ASSESSMENT — ENCOUNTER SYMPTOMS
OCCASIONAL FEELINGS OF UNSTEADINESS: 0
DEPRESSION: 0
LOSS OF SENSATION IN FEET: 0

## 2023-11-29 ASSESSMENT — PAIN SCALES - GENERAL: PAINLEVEL_OUTOF10: 3

## 2023-11-29 ASSESSMENT — PAIN - FUNCTIONAL ASSESSMENT: PAIN_FUNCTIONAL_ASSESSMENT: 0-10

## 2023-11-29 NOTE — PROGRESS NOTES
Physical Therapy  Physical Therapy Orthopedic Evaluation    Patient Name: Jhony Beyer  MRN: [id number]  Today's Date: 11/29/2023  Time Calculation  Start Time: 0950  Stop Time: 1040  Time Calculation (min): 50 min    Insurance:  Visit number: 7 of 60  Authorization info: no auth  Insurance Type: [employer] [place]    General:  Reason for visit: Right Robert TKA   Referred by: outside provider    Current Problem  1. Right knee pain  Follow Up In Physical Therapy      2. Knee stiffness, right  Follow Up In Physical Therapy      3. Difficulty walking  Follow Up In Physical Therapy      4. Orthopedic aftercare  Follow Up In Physical Therapy      5. Status post right partial knee replacement  Follow Up In Physical Therapy          Precautions: walking outdoors with std cane       Medical History Form: Reviewed (scanned into chart)    Subjective:     Chief Complaint: Patient presents to clinic s/p right partial knee arthoplasty on 11/6/2023 in [place], [place]. Pt reports overall he is doing well. Pt reports pain is manageable but continues to take pain medication 3x/day. Pt lives by himself and is independent with all ADLs. Pt does have 1 flight of stairs in the home with handrail on right side. Pt currently utilizing a step to step pattern to ascend and descend stairs. Pt is able to shower. Pt had 2 weeks of home PT and things went well.  Onset Date: 11/6/2023      Current Condition:   Better    Pain:  Pain Assessment: 0-10  Pain Score: 3  Location: right medial knee pain  Description: ache  Aggravating Factors: Knee Flexion, Standing, Walking, Stair Negotiation, and Squatting  Relieving Factors:  Rest and Ice    Relevant Information (PMH & Previous Tests/Imaging): N/A  Previous Interventions/Treatments: Physical Therapy    Prior Level of Function (PLOF)  Patient previously independent with all ADLs  Exercise/Physical Activity: Peloton   Work/School:  construction bussiness    Patients Living Environment:  "Reviewed and no concern    Primary Language: English    Patient's Goal(s) for Therapy: to improve ROM and strength to return to normal activities pain free and no limitattions    Red Flags: Do you have any of the following? No  Fever/chills, unexplained weight changes, dizziness/fainting, unexplained change in bowel or bladder functions, unexplained malaise or muscle weakness, night pain/sweats, numbness or tingling    Objective:  Objective       ROM    Hip AROM (Degrees)      (R)  (L)  Flexion: WNL  WNL   Extension: WNL  WNL     Abduction: WNL  WNL     Adduction: WNL  WNL     ER:  WNL  WNL     IR:  WNL  WNL         Knee AROM (Degrees)      (R)  (L)  Flexion: 110*  130      Extension: +5  0         Knee PROM (Degrees)      (R)  (L)  Flexion: 110*  130      Extension: 0  0                  Strength Testing    Hip  5 plinth squats with left weight shifting          Knee    (R)  (L)  Flexion: 4/5  5/5      Extension: 4/5  5/5             Functional Screening  Squat: contralateral weight shift to none surgical  Step up 6\" step: required RUE support of hand rail  1 flight of stairs\" step to step pattern to ascend and descend  Gait: antalgic gait with decrease stance time right comared to left with contralateral hip drop        Patella Mobility: hypo superior/inferior     Observation: incision clean and well approximated            Outcome Measures:  Other Measures  Lower Extremity Funtional Score (LEFS): 30/80     EDUCATION: home exercise program, plan of care, activity modifications, pain management, and injury pathology       Goals: Set and discussed today  Active       PT Problem       PT Goal 1       Start:  11/29/23    Expected End:  01/24/24       Demonstrate independence with home exercise program  Tolerate increased exercise without adverse reaction  Demonstrate improved posture & proper body mechanics throughout session  Increase right knee ROM to 0-125 pain free  Increase RLE strength to 5/5  Report decrease in " pain by > 2 points to meet the MCID  Increase score of LEFS by > 9 points to meet the MCID  Decrease time on 5x sit to stand test by > 2.3 sec to meet the MCID  Perform ADLs without an increase symptoms  Ascend / descend stairs without an increase in symptoms  Ambulate x 1000' without an increase in symptoms  Pt. will be able to sleep through the night without an increase in symptoms               Plan of care was developed with input and agreement by the patient      Treatment Performed:    Therapeutic Exercise:    25min  Verbal review of home PT HEP  Upright bike x10' (added to HEP)      Assessment: Patient presents s/p right partial knee arthoplasty on 11/6/2023 resulting in limited participation in pain-free ADLs and inability to perform at their prior level of function. Pt would benefit from physical therapy to address the impairments found & listed previously in the objective section in order to return to safe and pain-free ADLs and prior level of function.         Plan:     Planned Interventions include: therapeutic exercise, self-care home management, manual therapy, therapeutic activities, gait training, neuromuscular coordination, vasopneumatic, dry needling, aquatic therapy  Frequency: 1-2 x Week  Duration: 8 Weeks      Rico Schneider, PT

## 2023-12-04 ENCOUNTER — TREATMENT (OUTPATIENT)
Dept: PHYSICAL THERAPY | Facility: HOSPITAL | Age: 68
End: 2023-12-04
Payer: COMMERCIAL

## 2023-12-04 DIAGNOSIS — Z47.89 ORTHOPEDIC AFTERCARE: ICD-10-CM

## 2023-12-04 DIAGNOSIS — M25.661 KNEE STIFFNESS, RIGHT: ICD-10-CM

## 2023-12-04 DIAGNOSIS — Z96.651 STATUS POST RIGHT PARTIAL KNEE REPLACEMENT: ICD-10-CM

## 2023-12-04 DIAGNOSIS — M25.561 RIGHT KNEE PAIN: Primary | ICD-10-CM

## 2023-12-04 DIAGNOSIS — R26.2 DIFFICULTY WALKING: ICD-10-CM

## 2023-12-04 PROCEDURE — 97016 VASOPNEUMATIC DEVICE THERAPY: CPT | Mod: GP | Performed by: PHYSICAL THERAPIST

## 2023-12-04 PROCEDURE — 97110 THERAPEUTIC EXERCISES: CPT | Mod: GP | Performed by: PHYSICAL THERAPIST

## 2023-12-04 ASSESSMENT — PAIN - FUNCTIONAL ASSESSMENT: PAIN_FUNCTIONAL_ASSESSMENT: 0-10

## 2023-12-04 ASSESSMENT — PAIN SCALES - GENERAL: PAINLEVEL_OUTOF10: 3

## 2023-12-04 NOTE — PROGRESS NOTES
Physical Therapy  Physical Therapy Orthopedic Evaluation    Patient Name: Jhony Beyer  MRN: [id number]  Today's Date: 12/4/2023       Insurance:  Visit number: 8 of 60  Authorization info: no auth  Insurance Type: [employer] [facility] [place]    General:  Reason for visit: Right Robert TKA   Referred by: outside provider    Current Problem  1. Right knee pain        2. Knee stiffness, right        3. Difficulty walking        4. Orthopedic aftercare        5. Status post right partial knee replacement              Precautions: walking outdoors with std cane       Medical History Form: Reviewed (scanned into chart)    Subjective:   Onset Date: 11/6/2023      Current Condition:   Better    Pain:  Pain Assessment: 0-10  Pain Score: 3      Objective:  Objective       ROM        Knee AROM (Degrees)      (R)  (L)  Flexion: 110*  130      Extension: +5  0         Knee PROM (Degrees)      (R)  (L)  Flexion: 110*  130      Extension: 0  0              Goals: Set and discussed today  Active       PT Problem       PT Goal 1       Start:  11/29/23    Expected End:  01/24/24       Demonstrate independence with home exercise program  Tolerate increased exercise without adverse reaction  Demonstrate improved posture & proper body mechanics throughout session  Increase right knee ROM to 0-125 pain free  Increase RLE strength to 5/5  Report decrease in pain by > 2 points to meet the MCID  Increase score of LEFS by > 9 points to meet the MCID  Decrease time on 5x sit to stand test by > 2.3 sec to meet the MCID  Perform ADLs without an increase symptoms  Ascend / descend stairs without an increase in symptoms  Ambulate x 1000' without an increase in symptoms  Pt. will be able to sleep through the night without an increase in symptoms               Plan of care was developed with input and agreement by the patient      Treatment Performed:    Therapeutic Exercise:    30min  Upright bike x10'   Total gym squats 2 cords 3x20  Matrix knee  extensions 15# 3x10  Matrix knee flexion 15# 3x10  Goblet squats to bench height 15# 3x8    Vaso:    10min  34 deg mod compression  Assessment:     Pt tolerated session well by reporting he felt challenged but no major increase in soreness. Pt ROM continues to look good for this time period. Pt would benefit from further therapy to reach all established goals    Plan:     Planned Interventions include: therapeutic exercise, self-care home management, manual therapy, therapeutic activities, gait training, neuromuscular coordination, vasopneumatic, dry needling, aquatic therapy  Frequency: 1-2 x Week  Duration: 8 Weeks      Rico Schneider, PT

## 2023-12-06 ENCOUNTER — TREATMENT (OUTPATIENT)
Dept: PHYSICAL THERAPY | Facility: HOSPITAL | Age: 68
End: 2023-12-06
Payer: COMMERCIAL

## 2023-12-06 DIAGNOSIS — Z96.651 STATUS POST RIGHT PARTIAL KNEE REPLACEMENT: ICD-10-CM

## 2023-12-06 DIAGNOSIS — Z47.89 ORTHOPEDIC AFTERCARE: ICD-10-CM

## 2023-12-06 DIAGNOSIS — R26.2 DIFFICULTY WALKING: ICD-10-CM

## 2023-12-06 DIAGNOSIS — M25.561 RIGHT KNEE PAIN: Primary | ICD-10-CM

## 2023-12-06 DIAGNOSIS — M25.661 KNEE STIFFNESS, RIGHT: ICD-10-CM

## 2023-12-06 PROCEDURE — 97016 VASOPNEUMATIC DEVICE THERAPY: CPT | Mod: GP | Performed by: PHYSICAL THERAPIST

## 2023-12-06 PROCEDURE — 97110 THERAPEUTIC EXERCISES: CPT | Mod: GP | Performed by: PHYSICAL THERAPIST

## 2023-12-06 ASSESSMENT — PAIN SCALES - GENERAL: PAINLEVEL_OUTOF10: 2

## 2023-12-06 ASSESSMENT — PAIN - FUNCTIONAL ASSESSMENT: PAIN_FUNCTIONAL_ASSESSMENT: 0-10

## 2023-12-06 NOTE — PROGRESS NOTES
Physical Therapy  Physical Therapy Orthopedic Evaluation    Patient Name: Jhony Beyer  MRN: [id number]  Today's Date: 12/7/2023  Time Calculation  Start Time: 0800  Stop Time: 0905  Time Calculation (min): 65 min    Insurance:  Visit number: 9 of 60  Authorization info: no auth  Insurance Type: [employer] [facility] [place]    General:  Reason for visit: Right Robert TKA   Referred by: outside provider    Current Problem  1. Right knee pain        2. Knee stiffness, right        3. Difficulty walking        4. Orthopedic aftercare        5. Status post right partial knee replacement                Precautions: walking outdoors with std cane       Medical History Form: Reviewed (scanned into chart)    Subjective:   Onset Date: 11/6/2023    Pt arrived to therapy reporting he is feeling good overall. Pt states he was sore following last session but feels a lot better today. Pt has been biking at home    Current Condition:   Better    Pain:  Pain Assessment: 0-10  Pain Score: 2      Objective:  Objective       ROM        Knee AROM (Degrees)      (R)  (L)  Flexion: 110*  130      Extension: +5  0         Knee PROM (Degrees)      (R)  (L)  Flexion: 110*  130      Extension: 0  0              Goals: Set and discussed today  Active       PT Problem       PT Goal 1       Start:  11/29/23    Expected End:  01/24/24       Demonstrate independence with home exercise program  Tolerate increased exercise without adverse reaction  Demonstrate improved posture & proper body mechanics throughout session  Increase right knee ROM to 0-125 pain free  Increase RLE strength to 5/5  Report decrease in pain by > 2 points to meet the MCID  Increase score of LEFS by > 9 points to meet the MCID  Decrease time on 5x sit to stand test by > 2.3 sec to meet the MCID  Perform ADLs without an increase symptoms  Ascend / descend stairs without an increase in symptoms  Ambulate x 1000' without an increase in symptoms  Pt. will be able to sleep through  "the night without an increase in symptoms               Plan of care was developed with input and agreement by the patient      Treatment Performed:    Therapeutic Exercise:    55min  Upright bike x10'   Total gym squats 2 cords 3x20  Fwd step up 6\" step with unilateral UE support 3x12  Matrix knee flexion 15# 3x10  Goblet squats to bench height 26# 3x8  Sled pushes 10 yards x4  Single leg stance x ball reach 3x20    Vaso:    10min  34 deg mod compression  Assessment:     Pt tolerated session well by reporting he felt challenged but no major increase in soreness. Pt ROM continues to look good for this time period. Pt would benefit from further therapy to reach all established goals    Plan:     Planned Interventions include: therapeutic exercise, self-care home management, manual therapy, therapeutic activities, gait training, neuromuscular coordination, vasopneumatic, dry needling, aquatic therapy  Frequency: 1-2 x Week  Duration: 8 Weeks      Rico Schneider, PT   "

## 2023-12-11 ENCOUNTER — TREATMENT (OUTPATIENT)
Dept: PHYSICAL THERAPY | Facility: HOSPITAL | Age: 68
End: 2023-12-11
Payer: COMMERCIAL

## 2023-12-11 DIAGNOSIS — R26.2 DIFFICULTY WALKING: ICD-10-CM

## 2023-12-11 DIAGNOSIS — Z47.89 ORTHOPEDIC AFTERCARE: ICD-10-CM

## 2023-12-11 DIAGNOSIS — M25.661 KNEE STIFFNESS, RIGHT: ICD-10-CM

## 2023-12-11 DIAGNOSIS — Z96.651 STATUS POST RIGHT PARTIAL KNEE REPLACEMENT: Primary | ICD-10-CM

## 2023-12-11 DIAGNOSIS — M25.561 RIGHT KNEE PAIN: ICD-10-CM

## 2023-12-11 PROCEDURE — 97110 THERAPEUTIC EXERCISES: CPT | Mod: GP | Performed by: PHYSICAL THERAPIST

## 2023-12-11 PROCEDURE — 97016 VASOPNEUMATIC DEVICE THERAPY: CPT | Mod: GP | Performed by: PHYSICAL THERAPIST

## 2023-12-11 ASSESSMENT — PAIN SCALES - GENERAL: PAINLEVEL_OUTOF10: 2

## 2023-12-11 ASSESSMENT — PAIN - FUNCTIONAL ASSESSMENT: PAIN_FUNCTIONAL_ASSESSMENT: 0-10

## 2023-12-11 NOTE — PROGRESS NOTES
Physical Therapy  Physical Therapy Orthopedic Evaluation    Patient Name: Jhony Beyer  MRN: [id number]  Today's Date: 12/11/2023  Time Calculation  Start Time: 0755  Stop Time: 0905  Time Calculation (min): 70 min    Insurance:  Visit number: 10 of 60  Authorization info: no auth  Insurance Type: [employer] [facility] [place]    General:  Reason for visit: Right Robert TKA   Referred by: outside provider    Current Problem  1. Status post right partial knee replacement        2. Orthopedic aftercare        3. Difficulty walking        4. Knee stiffness, right        5. Right knee pain                  Precautions: none       Medical History Form: Reviewed (scanned into chart)    Subjective:   Onset Date: 11/6/2023  Pt arrived to therapy reporting overall he is doing well. Pt has a little soreness today.     Current Condition:   Better    Pain:  Pain Assessment: 0-10  Pain Score: 2      Objective:  Objective       ROM        Knee AROM (Degrees)      (R)  (L)  Flexion: 125  130      Extension: +5  0         Knee PROM (Degrees)      (R)  (L)  Flexion: 125  130      Extension: 0  0              Goals: Set and discussed today  Active       PT Problem       PT Goal 1       Start:  11/29/23    Expected End:  01/24/24       Demonstrate independence with home exercise program  Tolerate increased exercise without adverse reaction  Demonstrate improved posture & proper body mechanics throughout session  Increase right knee ROM to 0-125 pain free  Increase RLE strength to 5/5  Report decrease in pain by > 2 points to meet the MCID  Increase score of LEFS by > 9 points to meet the MCID  Decrease time on 5x sit to stand test by > 2.3 sec to meet the MCID  Perform ADLs without an increase symptoms  Ascend / descend stairs without an increase in symptoms  Ambulate x 1000' without an increase in symptoms  Pt. will be able to sleep through the night without an increase in symptoms                 Plan of care was developed with input  "and agreement by the patient      Treatment Performed:    Therapeutic Exercise:    55min  Upright bike x6'  Calf stretch on slant board 3x30\" each   Total gym single leg squats 1/2 cords 3x12  Fwd step up 6\" step with unilateral UE support 3x12 NT  Matrix knee flexion 45# 3x12  Goblet squats to bench height 26# 3x8 NT  Sled pushes 10 yards x4 45#  Single leg stance on AIREX 30\" x5  Resisted side stepping orange TB loop 5 steps each x5 (4 sets)  TRX retro lunge 3x8  Physio ball knee flexion 3x15    Vaso:    10min  34 deg mod compression  Assessment:     Pt tolerated session well by reporting he felt challenged but no major increase in soreness. Pt ROM continues to look good for this time period. Pt would benefit from further therapy to reach all established goals    Plan:     Planned Interventions include: therapeutic exercise, self-care home management, manual therapy, therapeutic activities, gait training, neuromuscular coordination, vasopneumatic, dry needling, aquatic therapy  Frequency: 1-2 x Week  Duration: 8 Weeks      Rico Schneider, PT   "

## 2023-12-13 ENCOUNTER — TREATMENT (OUTPATIENT)
Dept: PHYSICAL THERAPY | Facility: HOSPITAL | Age: 68
End: 2023-12-13
Payer: COMMERCIAL

## 2023-12-13 DIAGNOSIS — R26.2 DIFFICULTY WALKING: ICD-10-CM

## 2023-12-13 DIAGNOSIS — Z47.89 ORTHOPEDIC AFTERCARE: ICD-10-CM

## 2023-12-13 DIAGNOSIS — M25.661 KNEE STIFFNESS, RIGHT: ICD-10-CM

## 2023-12-13 DIAGNOSIS — M25.561 ACUTE PAIN OF RIGHT KNEE: ICD-10-CM

## 2023-12-13 DIAGNOSIS — M25.561 RIGHT KNEE PAIN: ICD-10-CM

## 2023-12-13 DIAGNOSIS — Z96.651 STATUS POST RIGHT PARTIAL KNEE REPLACEMENT: Primary | ICD-10-CM

## 2023-12-13 PROCEDURE — 97110 THERAPEUTIC EXERCISES: CPT | Mod: GP | Performed by: PHYSICAL THERAPIST

## 2023-12-13 PROCEDURE — 97016 VASOPNEUMATIC DEVICE THERAPY: CPT | Mod: GP | Performed by: PHYSICAL THERAPIST

## 2023-12-13 ASSESSMENT — PAIN - FUNCTIONAL ASSESSMENT: PAIN_FUNCTIONAL_ASSESSMENT: 0-10

## 2023-12-13 ASSESSMENT — PAIN SCALES - GENERAL: PAINLEVEL_OUTOF10: 1

## 2023-12-13 NOTE — PROGRESS NOTES
Physical Therapy  Physical Therapy Orthopedic Evaluation    Patient Name: Jhony Beyer  MRN: [id number]  Today's Date: 12/13/2023  Time Calculation  Start Time: 0800  Stop Time: 0905  Time Calculation (min): 65 min    Insurance:  Visit number: 11 of 60  Authorization info: no auth  Insurance Type: [employer] [place]    General:  Reason for visit: Right Robert TKA   Referred by: outside provider    Current Problem  1. Status post right partial knee replacement        2. Orthopedic aftercare        3. Difficulty walking        4. Knee stiffness, right        5. Right knee pain        6. Acute pain of right knee [M25.561]                    Precautions: none       Medical History Form: Reviewed (scanned into chart)    Subjective:   Onset Date: 11/6/2023  Pt arrived to therapy reporting overall he is doing well. Pt states he had his first night where he slept through the night. Pt continues to do pelaton    Current Condition:   Better    Pain:  Pain Assessment: 0-10  Pain Score: 1      Objective:  Objective       ROM        Knee AROM (Degrees)      (R)  (L)  Flexion: 125  130      Extension: +5  0         Knee PROM (Degrees)      (R)  (L)  Flexion: 125  130      Extension: 0  0              Goals: Set and discussed today  Active       PT Problem       PT Goal 1       Start:  11/29/23    Expected End:  01/24/24       Demonstrate independence with home exercise program  Tolerate increased exercise without adverse reaction  Demonstrate improved posture & proper body mechanics throughout session  Increase right knee ROM to 0-125 pain free  Increase RLE strength to 5/5  Report decrease in pain by > 2 points to meet the MCID  Increase score of LEFS by > 9 points to meet the MCID  Decrease time on 5x sit to stand test by > 2.3 sec to meet the MCID  Perform ADLs without an increase symptoms  Ascend / descend stairs without an increase in symptoms  Ambulate x 1000' without an increase in symptoms  Pt. will be able to  "sleep through the night without an increase in symptoms                 Plan of care was developed with input and agreement by the patient      Treatment Performed:    Therapeutic Exercise:    55min  Upright bike x6'  Calf stretch on slant board 3x30\" each   Total gym single leg squats 1/2 cords 3x12  Fwd step up 6\" step with unilateral UE support 3x12   Matrix knee flexion 45# 3x12  Goblet squats to bench height 26# 3x8 NT  Sled pushes 10 yards x4 45#  4 way steam boats yellow TB 2x20 each  Resisted side stepping orange TB loop 5 steps each x5 (4 sets)  Physio ball knee flexion 3x15    Vaso:    10min  34 deg mod compression  Assessment:     Pt tolerated session well by reporting he felt challenged but no major increase in soreness. Pt ROM continues to look good for this time period. Pt would benefit from further therapy to reach all established goals    Plan:     Planned Interventions include: therapeutic exercise, self-care home management, manual therapy, therapeutic activities, gait training, neuromuscular coordination, vasopneumatic, dry needling, aquatic therapy  Frequency: 1-2 x Week  Duration: 8 Weeks      Rico Schneider, PT   "

## 2023-12-15 ENCOUNTER — OFFICE VISIT (OUTPATIENT)
Dept: CARDIOLOGY | Facility: CLINIC | Age: 68
End: 2023-12-15
Payer: COMMERCIAL

## 2023-12-15 VITALS
WEIGHT: 209.31 LBS | HEIGHT: 68 IN | SYSTOLIC BLOOD PRESSURE: 160 MMHG | OXYGEN SATURATION: 93 % | DIASTOLIC BLOOD PRESSURE: 83 MMHG | HEART RATE: 71 BPM | BODY MASS INDEX: 31.72 KG/M2

## 2023-12-15 DIAGNOSIS — I48.3 TYPICAL ATRIAL FLUTTER (MULTI): Primary | ICD-10-CM

## 2023-12-15 PROCEDURE — 93005 ELECTROCARDIOGRAM TRACING: CPT | Performed by: NURSE PRACTITIONER

## 2023-12-15 PROCEDURE — 99214 OFFICE O/P EST MOD 30 MIN: CPT | Performed by: NURSE PRACTITIONER

## 2023-12-15 PROCEDURE — 3077F SYST BP >= 140 MM HG: CPT | Performed by: NURSE PRACTITIONER

## 2023-12-15 PROCEDURE — 1036F TOBACCO NON-USER: CPT | Performed by: NURSE PRACTITIONER

## 2023-12-15 PROCEDURE — 1126F AMNT PAIN NOTED NONE PRSNT: CPT | Performed by: NURSE PRACTITIONER

## 2023-12-15 PROCEDURE — 1160F RVW MEDS BY RX/DR IN RCRD: CPT | Performed by: NURSE PRACTITIONER

## 2023-12-15 PROCEDURE — 1159F MED LIST DOCD IN RCRD: CPT | Performed by: NURSE PRACTITIONER

## 2023-12-15 PROCEDURE — 3079F DIAST BP 80-89 MM HG: CPT | Performed by: NURSE PRACTITIONER

## 2023-12-15 ASSESSMENT — ENCOUNTER SYMPTOMS
DOUBLE VISION: 0
FALLS: 0
FEVER: 0
COUGH: 0
HEADACHES: 0
IRREGULAR HEARTBEAT: 0
PND: 0
SPUTUM PRODUCTION: 0
VOMITING: 0
HEMOPTYSIS: 0
ABDOMINAL PAIN: 0
NEAR-SYNCOPE: 0
ORTHOPNEA: 0
BLURRED VISION: 0
DIARRHEA: 0
SORE THROAT: 0
DEPRESSION: 0
OCCASIONAL FEELINGS OF UNSTEADINESS: 0
DIAPHORESIS: 0
NAUSEA: 0
LIGHT-HEADEDNESS: 0
MYALGIAS: 0
SNORING: 0
LOSS OF SENSATION IN FEET: 0
DYSPNEA ON EXERTION: 0

## 2023-12-15 ASSESSMENT — COLUMBIA-SUICIDE SEVERITY RATING SCALE - C-SSRS
6. HAVE YOU EVER DONE ANYTHING, STARTED TO DO ANYTHING, OR PREPARED TO DO ANYTHING TO END YOUR LIFE?: NO
2. HAVE YOU ACTUALLY HAD ANY THOUGHTS OF KILLING YOURSELF?: NO
1. IN THE PAST MONTH, HAVE YOU WISHED YOU WERE DEAD OR WISHED YOU COULD GO TO SLEEP AND NOT WAKE UP?: NO

## 2023-12-15 ASSESSMENT — PAIN SCALES - GENERAL: PAINLEVEL: 0-NO PAIN

## 2023-12-15 NOTE — PROGRESS NOTES
"Subjective   Jhony Beyer is a 68 y.o. male.      Jhony Beyer is a 68 year-old with    1. CAD - s/p CABG - 05/22/2020 Dr Alaniz internal thoracic artery to the LAD and in situ right internal thoracic artery   through the transverse sinus to the ramus intermedius.  2. Apical hypertrophic cardiomyopathy  3. Hypertension  4. AF - he had it post-operatively. Stopped Eliquis in October of 2019. He started having palpitations, couple of times per month. Lasting several minutes. He was recently in [facility] admitted with chest pain. During Stress ECHO he had brief episode of atrial flutter. Was reinitiated on Eliquis.    TESTING:    -cMRI: (Sept 2019) LVEF 70%. Apical hypertrophy, so significant scar burden.    -StressEcho: (Apr 2020) Resting EF 55-60% and 65-70% at peak exercise. ECG tracing nondiagnostic due to LVH. SVT and AFL noted during peak exercise with spontaneous conversion to NSR approx 6 min into recovery. No evidence of ischemia on ECG tracings or echo imaging.    -Nuclear Stress Lexiscan: (Mar 2023) No ischemia. LVEF 51%.   -MONITOR: (Mar-Apr 2023) Preventice 30 days. Showed 4 episodes of nonsustained VT longest run lasting 7 beats.   Pt triggered events correlated with NSR. Some 1st degree AVB noted. SVE ([place] <1%) and VE ([place] <1%)     Now s/p CTI RFA 9/7/2023 with Dr. Lezama   ECG 10/16/2023 NSR HR 63 bpm  ECG 12/15/2023 NSR HR 68 bpm, LVH    TODAY patient presents for 3-month follow-up post a flutter ablation.  He is feeling good and denies any recurrent episodes in the last 6 weeks.  He was initially having short episodes of his arrhythmia in the weeks following his ablation, that has since resolved.  He denies any chest pain, shortness of breath, palpitations and dizziness.  He denies any bleeding issues on Eliquis.    /83   Pulse 71   Ht 1.727 m (5' 8\")   Wt 94.9 kg (209 lb 5 oz)   SpO2 93%   BMI 31.83 kg/m²     Current Outpatient Medications on File Prior to Visit   Medication Sig " Dispense Refill    apixaban (Eliquis) 5 mg tablet Take 1 tablet (5 mg) by mouth 2 times a day. 180 tablet 3    levothyroxine (Synthroid, Levoxyl) 50 mcg tablet TAKE 1 TABLET BY MOUTH EVERY DAY 90 tablet 0    metoprolol tartrate (Lopressor) 100 mg tablet Take 1 tablet (100 mg) by mouth 2 times a day.      multivitamin tablet Take 1 tablet by mouth once daily.      nitroglycerin (Nitrostat) 0.4 mg SL tablet Place 1 tablet (0.4 mg) under the tongue every 5 minutes if needed for chest pain. For 3 doses then call 911      omega-3s-dha-epa-fish oil (Sea-Omega) 200 mg-300 mg- 100 mg-1,000 mg capsule Take by mouth.      rosuvastatin (Crestor) 20 mg tablet Take 1 tablet (20 mg) by mouth once daily.      sildenafil (Revatio) 20 mg tablet Take by mouth once daily.       No current facility-administered medications on file prior to visit.         Review of Systems   Constitutional: Negative for diaphoresis, fever and malaise/fatigue.   HENT:  Negative for congestion and sore throat.    Eyes:  Negative for blurred vision and double vision.   Cardiovascular:  Negative for dyspnea on exertion, irregular heartbeat, leg swelling, near-syncope, orthopnea and paroxysmal nocturnal dyspnea.   Respiratory:  Negative for cough, hemoptysis, snoring and sputum production.    Hematologic/Lymphatic: Negative for bleeding problem.   Skin:  Negative for rash.   Musculoskeletal:  Negative for falls, joint pain and myalgias.   Gastrointestinal:  Negative for abdominal pain, diarrhea, nausea and vomiting.   Neurological:  Negative for headaches and light-headedness.   All other systems reviewed and are negative.      Objective   Constitutional:       Appearance: Healthy appearance. Not in distress.   Eyes:      Conjunctiva/sclera: Conjunctivae normal.   HENT:      Nose: Nose normal.    Mouth/Throat:      Pharynx: Oropharynx is clear.   Neck:      Vascular: No JVR. JVD normal.   Pulmonary:      Effort: Pulmonary effort is normal.      Breath  sounds: Normal breath sounds. No wheezing. No rhonchi.   Chest:      Chest wall: Not tender to palpatation.   Cardiovascular:      Normal rate. Regular rhythm.      Murmurs: There is no murmur.      No rub.   Pulses:     Intact distal pulses.   Edema:     Peripheral edema absent.   Abdominal:      General: Bowel sounds are normal.      Palpations: Abdomen is soft.   Musculoskeletal: Normal range of motion.      Cervical back: Neck supple. Skin:     General: Skin is warm and dry.   Neurological:      Mental Status: Alert and oriented to person, place and time.      Motor: Motor function is intact.         Assessment/Plan   Typical Atrial Flutter  Patient has remained symptom-free for the past 6 weeks.  He admits to more energy and is feeling good.  He denies any hematuria hemoptysis or epistaxis on the blood thinner.  His [id number] score equals 2.  Given his history of hypertrophic cardiomyopathy, he would likely benefit from indefinite anticoagulation on Eliquis.  Patient understands and is agreeable.  He denies any cost issues.  We discussed lifestyle modifications that can help maintain normal sinus rhythm such as exercise, weight loss, managing hypertension, treating sleep apnea, and minimizing alcohol intake.  All questions asked and answered.    Continue current medications  Follow-up with general cardiology as scheduled  Follow-up with EP as needed

## 2023-12-18 ENCOUNTER — TREATMENT (OUTPATIENT)
Dept: PHYSICAL THERAPY | Facility: HOSPITAL | Age: 68
End: 2023-12-18
Payer: COMMERCIAL

## 2023-12-18 DIAGNOSIS — M25.561 ACUTE PAIN OF RIGHT KNEE: ICD-10-CM

## 2023-12-18 DIAGNOSIS — M25.561 RIGHT KNEE PAIN: ICD-10-CM

## 2023-12-18 DIAGNOSIS — M25.661 KNEE STIFFNESS, RIGHT: ICD-10-CM

## 2023-12-18 DIAGNOSIS — Z47.89 ORTHOPEDIC AFTERCARE: ICD-10-CM

## 2023-12-18 DIAGNOSIS — R26.2 DIFFICULTY WALKING: ICD-10-CM

## 2023-12-18 DIAGNOSIS — Z96.651 STATUS POST RIGHT PARTIAL KNEE REPLACEMENT: Primary | ICD-10-CM

## 2023-12-18 PROCEDURE — 97110 THERAPEUTIC EXERCISES: CPT | Mod: GP | Performed by: PHYSICAL THERAPIST

## 2023-12-18 PROCEDURE — 97016 VASOPNEUMATIC DEVICE THERAPY: CPT | Mod: GP | Performed by: PHYSICAL THERAPIST

## 2023-12-18 ASSESSMENT — PAIN SCALES - GENERAL: PAINLEVEL_OUTOF10: 2

## 2023-12-18 ASSESSMENT — PAIN - FUNCTIONAL ASSESSMENT: PAIN_FUNCTIONAL_ASSESSMENT: 0-10

## 2023-12-18 NOTE — PROGRESS NOTES
Physical Therapy  Physical Therapy Orthopedic Evaluation    Patient Name: Jhony Beyer  MRN: [id number]  Today's Date: 12/13/2023  Time Calculation  Start Time: 0800  Stop Time: 0900  Time Calculation (min): 60 min    Insurance:  Visit number: 12 of 60  Authorization info: no auth  Insurance Type: [employer] [place]    General:  Reason for visit: Right Robert TKA   Referred by: outside provider    Current Problem  1. Status post right partial knee replacement        2. Orthopedic aftercare        3. Difficulty walking        4. Knee stiffness, right        5. Right knee pain        6. Acute pain of right knee [M25.561]                      Precautions: none       Medical History Form: Reviewed (scanned into chart)    Subjective:   Onset Date: 11/6/2023  Pt arrived to therapy reporting he has been sore since last visit. Pt states he has been doing more at home and last session might have been too much. Pt reports compliance with HEP    Current Condition:   Better    Pain:  Pain Assessment: 0-10  Pain Score: 2      Objective:  Objective       ROM        Knee AROM (Degrees)      (R)  (L)  Flexion: 125  130      Extension: +5  0         Knee PROM (Degrees)      (R)  (L)  Flexion: 125  130      Extension: 0  0              Goals: Set and discussed today  Active       PT Problem       PT Goal 1       Start:  11/29/23    Expected End:  01/24/24       Demonstrate independence with home exercise program  Tolerate increased exercise without adverse reaction  Demonstrate improved posture & proper body mechanics throughout session  Increase right knee ROM to 0-125 pain free  Increase RLE strength to 5/5  Report decrease in pain by > 2 points to meet the MCID  Increase score of LEFS by > 9 points to meet the MCID  Decrease time on 5x sit to stand test by > 2.3 sec to meet the MCID  Perform ADLs without an increase symptoms  Ascend / descend stairs without an increase in symptoms  Ambulate x 1000' without an increase in  "symptoms  Pt. will be able to sleep through the night without an increase in symptoms                 Plan of care was developed with input and agreement by the patient      Treatment Performed:    Therapeutic Exercise:    50min  Upright bike x6'  Calf stretch on slant board 3x30\" each   Total gym bilateral leg squats 2 cords 3x12  Fwd step up 6\" step with unilateral UE support 3x12 NT  Matrix knee flexion 45# 3x12 NT  Goblet squats to bench height 26# 3x8 NT  Sled pushes 10 yards x4 45# NT  4 way steam boats yellow TB 2x20 each NT  Resisted side stepping orange TB loop 5 steps each x5 (4 sets)  Fwd monster walks  Physio ball knee flexion 3x15  KB RDL to 6\" box 3x12    Vaso:    10min  34 deg mod compression  Assessment:     Pt tolerated session well by reporting he felt challenged but no major increase in soreness. Pt ROM continues to look good for this time period. Pt would benefit from further therapy to reach all established goals    Plan:     Planned Interventions include: therapeutic exercise, self-care home management, manual therapy, therapeutic activities, gait training, neuromuscular coordination, vasopneumatic, dry needling, aquatic therapy  Frequency: 1-2 x Week  Duration: 8 Weeks      Rico Schneider, PT   "

## 2023-12-20 ENCOUNTER — APPOINTMENT (OUTPATIENT)
Dept: PHYSICAL THERAPY | Facility: HOSPITAL | Age: 68
End: 2023-12-20
Payer: COMMERCIAL

## 2023-12-21 ENCOUNTER — TREATMENT (OUTPATIENT)
Dept: PHYSICAL THERAPY | Facility: HOSPITAL | Age: 68
End: 2023-12-21
Payer: COMMERCIAL

## 2023-12-21 DIAGNOSIS — Z96.651 STATUS POST RIGHT PARTIAL KNEE REPLACEMENT: Primary | ICD-10-CM

## 2023-12-21 DIAGNOSIS — R26.2 DIFFICULTY WALKING: ICD-10-CM

## 2023-12-21 DIAGNOSIS — Z47.89 ORTHOPEDIC AFTERCARE: ICD-10-CM

## 2023-12-21 DIAGNOSIS — M25.661 KNEE STIFFNESS, RIGHT: ICD-10-CM

## 2023-12-21 DIAGNOSIS — M25.561 ACUTE PAIN OF RIGHT KNEE: ICD-10-CM

## 2023-12-21 DIAGNOSIS — M25.561 RIGHT KNEE PAIN: ICD-10-CM

## 2023-12-21 PROCEDURE — 97110 THERAPEUTIC EXERCISES: CPT | Mod: GP | Performed by: PHYSICAL THERAPIST

## 2023-12-21 PROCEDURE — 97016 VASOPNEUMATIC DEVICE THERAPY: CPT | Mod: GP | Performed by: PHYSICAL THERAPIST

## 2023-12-21 ASSESSMENT — PAIN SCALES - GENERAL: PAINLEVEL_OUTOF10: 1

## 2023-12-21 ASSESSMENT — PAIN - FUNCTIONAL ASSESSMENT: PAIN_FUNCTIONAL_ASSESSMENT: 0-10

## 2023-12-21 NOTE — PROGRESS NOTES
Physical Therapy  Physical Therapy Orthopedic Evaluation    Patient Name: Jhony Beyer  MRN: [id number]  Today's Date: 12/21/2023  Time Calculation  Start Time: 0700  Stop Time: 0800  Time Calculation (min): 60 min    Insurance:  Visit number: 13 of 60  Authorization info: no auth  Insurance Type: [employer] [place]    General:  Reason for visit: Right Robert TKA   Referred by: outside provider    Current Problem  1. Status post right partial knee replacement        2. Orthopedic aftercare        3. Difficulty walking        4. Knee stiffness, right        5. Acute pain of right knee        6. Right knee pain              Precautions: none       Medical History Form: Reviewed (scanned into chart)    Subjective:   Onset Date: 11/6/2023  Pt arrived to therapy reporting he is doing really well today. Pt states he had a good follow up with surgeon and he is pleased with progress    Current Condition:   Better    Pain:  Pain Assessment: 0-10  Pain Score: 1      Objective:  Objective       ROM        Knee AROM (Degrees)      (R)  (L)  Flexion: 125  130      Extension: +5  0         Knee PROM (Degrees)      (R)  (L)  Flexion: 125  130      Extension: 0  0              Goals: Set and discussed today  Active       PT Problem       PT Goal 1       Start:  11/29/23    Expected End:  01/24/24       Demonstrate independence with home exercise program  Tolerate increased exercise without adverse reaction  Demonstrate improved posture & proper body mechanics throughout session  Increase right knee ROM to 0-125 pain free  Increase RLE strength to 5/5  Report decrease in pain by > 2 points to meet the MCID  Increase score of LEFS by > 9 points to meet the MCID  Decrease time on 5x sit to stand test by > 2.3 sec to meet the MCID  Perform ADLs without an increase symptoms  Ascend / descend stairs without an increase in symptoms  Ambulate x 1000' without an increase in symptoms  Pt. will be able to sleep through the night  "without an increase in symptoms                 Plan of care was developed with input and agreement by the patient      Treatment Performed:    Therapeutic Exercise:    50min  Upright bike x6'  Calf stretch on slant board 3x30\" each   Total gym bilateral leg squats 2 cords 3x12  Fwd step up 8\" step with unilateral UE support 3x12 NT  Matrix knee flexion 45# 3x12 NT  Goblet squats to bench height 26# 3x8 NT  Sled pushes 10 yards x4 45# NT  4 way steam boats yellow TB 2x20 each NT  Resisted side stepping orange TB loop 5 steps each x5 (4 sets)  Fwd monster walks  Physio ball knee flexion 3x15  KB RDL to 6\" box 3x12    Vaso:    10min  34 deg mod compression  Assessment:     Pt tolerated session well by reporting he felt challenged but no major increase in soreness. Pt ROM continues to look good for this time period. Pt would benefit from further therapy to reach all established goals    Plan:     Planned Interventions include: therapeutic exercise, self-care home management, manual therapy, therapeutic activities, gait training, neuromuscular coordination, vasopneumatic, dry needling, aquatic therapy  Frequency: 1-2 x Week  Duration: 8 Weeks      Rico Schneider, PT   "

## 2023-12-30 LAB
ATRIAL RATE: 68 BPM
P AXIS: 66 DEGREES
P OFFSET: 185 MS
P ONSET: 122 MS
PR INTERVAL: 188 MS
Q ONSET: 216 MS
QRS COUNT: 12 BEATS
QRS DURATION: 82 MS
QT INTERVAL: 436 MS
QTC CALCULATION(BAZETT): 463 MS
QTC FREDERICIA: 454 MS
R AXIS: 55 DEGREES
T AXIS: 154 DEGREES
T OFFSET: 434 MS
VENTRICULAR RATE: 68 BPM

## 2024-01-02 ENCOUNTER — TREATMENT (OUTPATIENT)
Dept: PHYSICAL THERAPY | Facility: HOSPITAL | Age: 69
End: 2024-01-02
Payer: COMMERCIAL

## 2024-01-02 DIAGNOSIS — Z96.651 STATUS POST RIGHT PARTIAL KNEE REPLACEMENT: Primary | ICD-10-CM

## 2024-01-02 DIAGNOSIS — R26.2 DIFFICULTY WALKING: ICD-10-CM

## 2024-01-02 DIAGNOSIS — M25.561 RIGHT KNEE PAIN: ICD-10-CM

## 2024-01-02 DIAGNOSIS — M25.661 KNEE STIFFNESS, RIGHT: ICD-10-CM

## 2024-01-02 DIAGNOSIS — Z47.89 ORTHOPEDIC AFTERCARE: ICD-10-CM

## 2024-01-02 DIAGNOSIS — M25.561 ACUTE PAIN OF RIGHT KNEE: ICD-10-CM

## 2024-01-02 PROCEDURE — 97016 VASOPNEUMATIC DEVICE THERAPY: CPT | Mod: GP | Performed by: PHYSICAL THERAPIST

## 2024-01-02 PROCEDURE — 97140 MANUAL THERAPY 1/> REGIONS: CPT | Mod: GP | Performed by: PHYSICAL THERAPIST

## 2024-01-02 PROCEDURE — 97110 THERAPEUTIC EXERCISES: CPT | Mod: GP | Performed by: PHYSICAL THERAPIST

## 2024-01-02 ASSESSMENT — PAIN SCALES - GENERAL: PAINLEVEL_OUTOF10: 3

## 2024-01-02 ASSESSMENT — PAIN - FUNCTIONAL ASSESSMENT: PAIN_FUNCTIONAL_ASSESSMENT: 0-10

## 2024-01-02 NOTE — PROGRESS NOTES
Physical Therapy  Physical Therapy Orthopedic Evaluation    Patient Name: Jhony Beyer  MRN: [id number]  Today's Date: 1/2/2024  Time Calculation  Start Time: 0700  Stop Time: 0805  Time Calculation (min): 65 min    Insurance:  Visit number: 14 of 60  Authorization info: no auth  Insurance Type: [employer] [place]    General:  Reason for visit: Right Robert TKA   Referred by: outside provider    Current Problem  1. Status post right partial knee replacement        2. Orthopedic aftercare        3. Difficulty walking        4. Knee stiffness, right        5. Acute pain of right knee        6. Right knee pain                Precautions: none       Medical History Form: Reviewed (scanned into chart)    Subjective:   Onset Date: 11/6/2023  Pt arrived to therapy reporting he feels like he had a set back since last visit. Pt did a lot of walking over the holiday break ie 2 football games and son lives on the third floor.    Current Condition:   Better    Pain:  Pain Assessment: 0-10  Pain Score: 3      Objective:  Objective       ROM    Observations:    Mild edema suprapatellar sac    Knee AROM (Degrees)      (R)  (L)  Flexion: 125  130      Extension: +5  0         Knee PROM (Degrees)      (R)  (L)  Flexion: 125  130      Extension: 0  0              Goals: Set and discussed today  Active       PT Problem       PT Goal 1       Start:  11/29/23    Expected End:  01/24/24       Demonstrate independence with home exercise program  Tolerate increased exercise without adverse reaction  Demonstrate improved posture & proper body mechanics throughout session  Increase right knee ROM to 0-125 pain free  Increase RLE strength to 5/5  Report decrease in pain by > 2 points to meet the MCID  Increase score of LEFS by > 9 points to meet the MCID  Decrease time on 5x sit to stand test by > 2.3 sec to meet the MCID  Perform ADLs without an increase symptoms  Ascend / descend stairs without an increase in symptoms  Ambulate x  "1000' without an increase in symptoms  Pt. will be able to sleep through the night without an increase in symptoms                 Plan of care was developed with input and agreement by the patient      Treatment Performed:    Therapeutic Exercise:    30min  Upright bike x7'  Calf stretch on slant board 3x30\" each   Total gym bilateral leg squats 0 cords 3x15  Fwd step up 8\" step with unilateral UE support 3x12 NT  Matrix knee flexion 45# 3x12 NT  Goblet squats to bench height 26# 3x8 NT  Sled pushes 10 yards x4 45# NT  4 way steam boats yellow TB 2x20 each NT  Resisted side stepping orange TB loop 5 steps each x5 (4 sets)  Fwd monster walks  Physio ball knee flexion 3x15  KB RDL to 6\" box 3x12    Manual Therapy:    15min  STM to knee area to help facilitate fluid motion for edema  Vaso:    20min    34 deg mod compression  Assessment:     Pt had slight set back today secondary to knee pain/stiffness/edema. I believe this is due to perform a lot of stairs and walking a lot. We kept things easy today and will reassess next visit  Plan:     Planned Interventions include: therapeutic exercise, self-care home management, manual therapy, therapeutic activities, gait training, neuromuscular coordination, vasopneumatic, dry needling, aquatic therapy  Frequency: 1-2 x Week  Duration: 8 Weeks      Rico Schneider, PT   "

## 2024-01-04 ENCOUNTER — TREATMENT (OUTPATIENT)
Dept: PHYSICAL THERAPY | Facility: HOSPITAL | Age: 69
End: 2024-01-04
Payer: COMMERCIAL

## 2024-01-04 DIAGNOSIS — M25.561 ACUTE PAIN OF RIGHT KNEE: ICD-10-CM

## 2024-01-04 DIAGNOSIS — Z47.89 ORTHOPEDIC AFTERCARE: ICD-10-CM

## 2024-01-04 DIAGNOSIS — M25.661 KNEE STIFFNESS, RIGHT: ICD-10-CM

## 2024-01-04 DIAGNOSIS — Z96.651 STATUS POST RIGHT PARTIAL KNEE REPLACEMENT: Primary | ICD-10-CM

## 2024-01-04 DIAGNOSIS — R26.2 DIFFICULTY WALKING: ICD-10-CM

## 2024-01-04 PROCEDURE — 97530 THERAPEUTIC ACTIVITIES: CPT | Mod: GP | Performed by: PHYSICAL THERAPIST

## 2024-01-04 PROCEDURE — 97016 VASOPNEUMATIC DEVICE THERAPY: CPT | Mod: GP | Performed by: PHYSICAL THERAPIST

## 2024-01-04 PROCEDURE — 97110 THERAPEUTIC EXERCISES: CPT | Mod: GP | Performed by: PHYSICAL THERAPIST

## 2024-01-04 PROCEDURE — 97112 NEUROMUSCULAR REEDUCATION: CPT | Mod: GP | Performed by: PHYSICAL THERAPIST

## 2024-01-04 NOTE — PROGRESS NOTES
Physical Therapy  Physical Therapy Orthopedic Evaluation    Patient Name: Jhony Beyer  MRN: [id number]  Today's Date: 1/4/2024  Time Calculation  Start Time: 0700  Stop Time: 0810  Time Calculation (min): 70 min    Insurance:  Visit number: 15 of 60  Authorization info: no auth  Insurance Type: [employer] [place]    General:  Reason for visit: Right Robert TKA   Referred by: outside provider    Current Problem  1. Status post right partial knee replacement        2. Orthopedic aftercare        3. Difficulty walking        4. Knee stiffness, right        5. Acute pain of right knee                  Precautions: none       Medical History Form: Reviewed (scanned into chart)    Subjective:   Onset Date: 11/6/2023  Pt arrived to therapy reporting he is feeling slightly better than previous session but continues to have pain and swelling. Pt states stairs are more difficult and has increase pain with walking. Pt has been biking with no issues    Current Condition:   Better    Pain:  Pain Assessment: 0-10  Pain Score: 3      Objective:  Objective       ROM    Observations:    Mild edema suprapatellar sac and lateral knee (IT band insertion)    Knee AROM (Degrees)      (R)  (L)  Flexion: 125  130      Extension: +5  0         Knee PROM (Degrees)      (R)  (L)  Flexion: 125  130      Extension: 0  0              Goals: Set and discussed today  Active       PT Problem       PT Goal 1       Start:  11/29/23    Expected End:  01/24/24       Demonstrate independence with home exercise program  Tolerate increased exercise without adverse reaction  Demonstrate improved posture & proper body mechanics throughout session  Increase right knee ROM to 0-125 pain free  Increase RLE strength to 5/5  Report decrease in pain by > 2 points to meet the MCID  Increase score of LEFS by > 9 points to meet the MCID  Decrease time on 5x sit to stand test by > 2.3 sec to meet the MCID  Perform ADLs without an increase symptoms  Ascend /  "descend stairs without an increase in symptoms  Ambulate x 1000' without an increase in symptoms  Pt. will be able to sleep through the night without an increase in symptoms                 Plan of care was developed with input and agreement by the patient      Treatment Performed:    Therapeutic Exercise:    30min  Upright bike x7'  Single leg stance on box with contralateral hip abduction and extension 3x10 each  Hook lying clamshells  Long discussion on possible cause of lateral knee swelling    NMR:  Russian Stim to quad group LAQ 4\"on/12\"off x10'     Manual Therapy:    NT  STM to knee area to help facilitate fluid motion for edema  Vaso:    20min    34 deg min compression  Assessment:     Pt edema less that previous session. Pt edema appears to be secondary to IT Band bursa. We will continue to monitor  Plan:     Planned Interventions include: therapeutic exercise, self-care home management, manual therapy, therapeutic activities, gait training, neuromuscular coordination, vasopneumatic, dry needling, aquatic therapy  Frequency: 1-2 x Week  Duration: 8 Weeks      Rico Schneider, PT   "

## 2024-01-05 ASSESSMENT — PAIN SCALES - GENERAL: PAINLEVEL_OUTOF10: 3

## 2024-01-05 ASSESSMENT — PAIN - FUNCTIONAL ASSESSMENT: PAIN_FUNCTIONAL_ASSESSMENT: 0-10

## 2024-01-09 ENCOUNTER — TREATMENT (OUTPATIENT)
Dept: PHYSICAL THERAPY | Facility: HOSPITAL | Age: 69
End: 2024-01-09
Payer: COMMERCIAL

## 2024-01-09 DIAGNOSIS — M25.661 KNEE STIFFNESS, RIGHT: ICD-10-CM

## 2024-01-09 DIAGNOSIS — Z96.651 STATUS POST RIGHT PARTIAL KNEE REPLACEMENT: Primary | ICD-10-CM

## 2024-01-09 DIAGNOSIS — R26.2 DIFFICULTY WALKING: ICD-10-CM

## 2024-01-09 DIAGNOSIS — M25.561 ACUTE PAIN OF RIGHT KNEE: ICD-10-CM

## 2024-01-09 DIAGNOSIS — Z47.89 ORTHOPEDIC AFTERCARE: ICD-10-CM

## 2024-01-09 DIAGNOSIS — M25.561 RIGHT KNEE PAIN: ICD-10-CM

## 2024-01-09 PROCEDURE — 97140 MANUAL THERAPY 1/> REGIONS: CPT | Mod: GP | Performed by: PHYSICAL THERAPIST

## 2024-01-09 PROCEDURE — 97016 VASOPNEUMATIC DEVICE THERAPY: CPT | Mod: GP | Performed by: PHYSICAL THERAPIST

## 2024-01-09 PROCEDURE — 97110 THERAPEUTIC EXERCISES: CPT | Mod: GP | Performed by: PHYSICAL THERAPIST

## 2024-01-09 ASSESSMENT — PAIN SCALES - GENERAL: PAINLEVEL_OUTOF10: 2

## 2024-01-09 ASSESSMENT — PAIN - FUNCTIONAL ASSESSMENT: PAIN_FUNCTIONAL_ASSESSMENT: 0-10

## 2024-01-09 NOTE — PROGRESS NOTES
Physical Therapy  Physical Therapy Orthopedic Evaluation    Patient Name: Jhony Beyer  MRN: [id number]  Today's Date: 1/4/2024  Time Calculation  Start Time: 0700  Stop Time: 0800  Time Calculation (min): 60 min    Insurance:  Visit number: 16 of 60  Authorization info: no auth  Insurance Type: [employer] [place]    General:  Reason for visit: Right Robert TKA   Referred by: outside provider    Current Problem  1. Status post right partial knee replacement        2. Orthopedic aftercare        3. Difficulty walking        4. Knee stiffness, right        5. Acute pain of right knee        6. Right knee pain                    Precautions: none       Medical History Form: Reviewed (scanned into chart)    Subjective:   Onset Date: 11/6/2023  Pt arrived to therapy reporting he is noticing less swelling and less pain. Pt reports compliance with HEP    Current Condition:   Better    Pain:  Pain Assessment: 0-10  Pain Score: 2      Objective:  Objective       ROM    Observations:    Mild edema suprapatellar sac and lateral knee (IT band insertion)    Knee AROM (Degrees)      (R)  (L)  Flexion: 125  130      Extension: +5  0         Knee PROM (Degrees)      (R)  (L)  Flexion: 125  130      Extension: 0  0              Goals: Set and discussed today  Active       PT Problem       PT Goal 1       Start:  11/29/23    Expected End:  01/24/24       Demonstrate independence with home exercise program  Tolerate increased exercise without adverse reaction  Demonstrate improved posture & proper body mechanics throughout session  Increase right knee ROM to 0-125 pain free  Increase RLE strength to 5/5  Report decrease in pain by > 2 points to meet the MCID  Increase score of LEFS by > 9 points to meet the MCID  Decrease time on 5x sit to stand test by > 2.3 sec to meet the MCID  Perform ADLs without an increase symptoms  Ascend / descend stairs without an increase in symptoms  Ambulate x 1000' without an increase in  symptoms  Pt. will be able to sleep through the night without an increase in symptoms                 Plan of care was developed with input and agreement by the patient      Treatment Performed:    Therapeutic Exercise:    30min  Upright bike x7'  BFR strength protocol   LAQ 5# 30-15-15-15  Plinth squats 30-15-15-15  Hook lying clamshells  Single leg standing with contralateral hip abduction       Manual Therapy:    15  STM to knee area to help facilitate fluid motion for edema  Vaso:    10 min    34 deg min compression  Assessment:     Pt edema less that previous session. Pt edema appears to be secondary to IT Band bursa. We will continue to monitor  Plan:     Planned Interventions include: therapeutic exercise, self-care home management, manual therapy, therapeutic activities, gait training, neuromuscular coordination, vasopneumatic, dry needling, aquatic therapy  Frequency: 1-2 x Week  Duration: 8 Weeks      Rico Schneider, PT

## 2024-01-11 ENCOUNTER — TREATMENT (OUTPATIENT)
Dept: PHYSICAL THERAPY | Facility: HOSPITAL | Age: 69
End: 2024-01-11
Payer: COMMERCIAL

## 2024-01-11 DIAGNOSIS — Z47.89 ORTHOPEDIC AFTERCARE: ICD-10-CM

## 2024-01-11 DIAGNOSIS — M25.561 RIGHT KNEE PAIN: ICD-10-CM

## 2024-01-11 DIAGNOSIS — Z96.651 STATUS POST RIGHT PARTIAL KNEE REPLACEMENT: Primary | ICD-10-CM

## 2024-01-11 DIAGNOSIS — M25.561 ACUTE PAIN OF RIGHT KNEE: ICD-10-CM

## 2024-01-11 DIAGNOSIS — R26.2 DIFFICULTY WALKING: ICD-10-CM

## 2024-01-11 DIAGNOSIS — M25.661 KNEE STIFFNESS, RIGHT: ICD-10-CM

## 2024-01-11 PROCEDURE — 97140 MANUAL THERAPY 1/> REGIONS: CPT | Mod: GP | Performed by: PHYSICAL THERAPIST

## 2024-01-11 PROCEDURE — 97016 VASOPNEUMATIC DEVICE THERAPY: CPT | Mod: GP | Performed by: PHYSICAL THERAPIST

## 2024-01-11 PROCEDURE — 97110 THERAPEUTIC EXERCISES: CPT | Mod: GP | Performed by: PHYSICAL THERAPIST

## 2024-01-11 ASSESSMENT — PAIN SCALES - GENERAL: PAINLEVEL_OUTOF10: 1

## 2024-01-11 ASSESSMENT — PAIN - FUNCTIONAL ASSESSMENT: PAIN_FUNCTIONAL_ASSESSMENT: 0-10

## 2024-01-11 NOTE — PROGRESS NOTES
Physical Therapy  Physical Therapy Orthopedic Evaluation    Patient Name: Jhony Beyer  MRN: [id number]  Today's Date: 1/11/2024       Insurance:  Visit number: 4 (17 total) of 60  Authorization info: no auth  Insurance Type: [employer] [place]    General:  Reason for visit: Right Robert TKA   Referred by: outside provider    Current Problem  1. Status post right partial knee replacement        2. Orthopedic aftercare        3. Difficulty walking        4. Knee stiffness, right        5. Acute pain of right knee        6. Right knee pain              Precautions: none       Medical History Form: Reviewed (scanned into chart)    Subjective:   Onset Date: 11/6/2023  Pt arrived to therapy reporting he is feeling better overall. Pt reports less pain and less swelling. Pt states stairs continue to be difficult    Current Condition:   Better    Pain:  Pain Assessment: 0-10  Pain Score: 1      Objective:  Objective       ROM    Observations:    Mild edema suprapatellar sac and lateral knee (IT band insertion)    Knee AROM (Degrees)      (R)  (L)  Flexion: 125  130      Extension: +5  0         Knee PROM (Degrees)      (R)  (L)  Flexion: 125  130      Extension: 0  0              Goals: Set and discussed today  Active       PT Problem       PT Goal 1       Start:  11/29/23    Expected End:  01/24/24       Demonstrate independence with home exercise program  Tolerate increased exercise without adverse reaction  Demonstrate improved posture & proper body mechanics throughout session  Increase right knee ROM to 0-125 pain free  Increase RLE strength to 5/5  Report decrease in pain by > 2 points to meet the MCID  Increase score of LEFS by > 9 points to meet the MCID  Decrease time on 5x sit to stand test by > 2.3 sec to meet the MCID  Perform ADLs without an increase symptoms  Ascend / descend stairs without an increase in symptoms  Ambulate x 1000' without an increase in symptoms  Pt. will be able to sleep through the  "night without an increase in symptoms                 Plan of care was developed with input and agreement by the patient      Treatment Performed:    Therapeutic Exercise:    40min  Upright bike x7'  BFR strength protocol   LAQ 5# 30-15-15-15  Plinth squats 30-15-15-15  Fwd step up 2\" box 30-15-15-15  Total gym leg press seat 0 1 cord 3x12  Single leg standing with contralateral hip abduction review  Review of [facility]       Manual Therapy:    20  STM to knee area to help facilitate fluid motion for edema    Vaso:    10 min    34 deg min compression  Assessment:   Pt tolerated BFR today by denying any increase pain or any adverse reaction. Pt edema is resolving and quad activation is improving. Pt would continue to benefit from further therapy to reach all established goals  Plan:     Planned Interventions include: therapeutic exercise, self-care home management, manual therapy, therapeutic activities, gait training, neuromuscular coordination, vasopneumatic, dry needling, aquatic therapy      Rico Schneider, PT   "

## 2024-01-18 ENCOUNTER — TREATMENT (OUTPATIENT)
Dept: PHYSICAL THERAPY | Facility: HOSPITAL | Age: 69
End: 2024-01-18
Payer: COMMERCIAL

## 2024-01-18 DIAGNOSIS — M25.661 KNEE STIFFNESS, RIGHT: ICD-10-CM

## 2024-01-18 DIAGNOSIS — Z47.89 ORTHOPEDIC AFTERCARE: ICD-10-CM

## 2024-01-18 DIAGNOSIS — R26.2 DIFFICULTY WALKING: ICD-10-CM

## 2024-01-18 DIAGNOSIS — M25.561 ACUTE PAIN OF RIGHT KNEE: ICD-10-CM

## 2024-01-18 DIAGNOSIS — Z96.651 STATUS POST RIGHT PARTIAL KNEE REPLACEMENT: Primary | ICD-10-CM

## 2024-01-18 PROCEDURE — 97016 VASOPNEUMATIC DEVICE THERAPY: CPT | Mod: GP | Performed by: PHYSICAL THERAPIST

## 2024-01-18 PROCEDURE — 97140 MANUAL THERAPY 1/> REGIONS: CPT | Mod: GP | Performed by: PHYSICAL THERAPIST

## 2024-01-18 PROCEDURE — 97110 THERAPEUTIC EXERCISES: CPT | Mod: GP | Performed by: PHYSICAL THERAPIST

## 2024-01-18 ASSESSMENT — PAIN SCALES - GENERAL: PAINLEVEL_OUTOF10: 2

## 2024-01-18 ASSESSMENT — PAIN - FUNCTIONAL ASSESSMENT: PAIN_FUNCTIONAL_ASSESSMENT: 0-10

## 2024-01-18 NOTE — PROGRESS NOTES
Physical Therapy  Physical Therapy Orthopedic Evaluation    Patient Name: Jhony Beyer  MRN: [id number]  Today's Date: 1/18/2024  Time Calculation  Start Time: 0800  Stop Time: 0903  Time Calculation (min): 63 min    Insurance:  Visit number: 5 (17 total) of 60  Authorization info: no auth  Insurance Type: [employer] [place]    General:  Reason for visit: Right Robert TKA   Referred by: outside provider    Current Problem  1. Status post right partial knee replacement        2. Orthopedic aftercare        3. Difficulty walking        4. Knee stiffness, right        5. Acute pain of right knee                Precautions: none       Medical History Form: Reviewed (scanned into chart)    Subjective:   Onset Date: 11/6/2023  Pt arrived to therapy reporting he is doing ok today but is sore. Pt reports his soreness is medial and superior to patella. Pt reports pain is worse with sitting. Pt is better with walking    Current Condition:   Better    Pain:  Pain Assessment: 0-10  Pain Score: 2      Objective:  Objective       ROM    Observations:    Mild edema suprapatellar sac and lateral knee (IT band insertion)  Pain with palpation to adductor insertion  Knee AROM (Degrees)      (R)  (L)  Flexion: 125  130      Extension: +5  0         Knee PROM (Degrees)      (R)  (L)  Flexion: 125  130      Extension: 0  0              Goals: Set and discussed today  Active       PT Problem       PT Goal 1       Start:  11/29/23    Expected End:  01/24/24       Demonstrate independence with home exercise program  Tolerate increased exercise without adverse reaction  Demonstrate improved posture & proper body mechanics throughout session  Increase right knee ROM to 0-125 pain free  Increase RLE strength to 5/5  Report decrease in pain by > 2 points to meet the MCID  Increase score of LEFS by > 9 points to meet the MCID  Decrease time on 5x sit to stand test by > 2.3 sec to meet the MCID  Perform ADLs without an increase  symptoms  Ascend / descend stairs without an increase in symptoms  Ambulate x 1000' without an increase in symptoms  Pt. will be able to sleep through the night without an increase in symptoms                 Plan of care was developed with input and agreement by the patient      Treatment Performed:    Therapeutic Exercise:    40min  Upright bike x7'  BFR strength protocol   DL Glute bridge with ball squeeze 30-15-15-15  Clamshells 30-15-15-15  Knee extension isometric 3' holds 30-15-15-15  Plinth squats 30-15-15-15      Manual Therapy:    13  STM to knee area to help facilitate fluid motion for edema    Vaso:    10 min  34 deg, min compression    Assessment:   Pt had adductor insertion pain today. I believe this is secondary to increase workload secondary weakness/inactivity. Pt would continue to benefit from further therapy to reach all established goals  Plan:     Planned Interventions include: therapeutic exercise, self-care home management, manual therapy, therapeutic activities, gait training, neuromuscular coordination, vasopneumatic, dry needling, aquatic therapy      Rico Schneider, PT

## 2024-01-23 ENCOUNTER — TREATMENT (OUTPATIENT)
Dept: PHYSICAL THERAPY | Facility: HOSPITAL | Age: 69
End: 2024-01-23
Payer: COMMERCIAL

## 2024-01-23 DIAGNOSIS — Z96.651 STATUS POST RIGHT PARTIAL KNEE REPLACEMENT: Primary | ICD-10-CM

## 2024-01-23 DIAGNOSIS — M25.661 KNEE STIFFNESS, RIGHT: ICD-10-CM

## 2024-01-23 DIAGNOSIS — Z47.89 ORTHOPEDIC AFTERCARE: ICD-10-CM

## 2024-01-23 DIAGNOSIS — M25.561 ACUTE PAIN OF RIGHT KNEE: ICD-10-CM

## 2024-01-23 DIAGNOSIS — M25.561 RIGHT KNEE PAIN: ICD-10-CM

## 2024-01-23 DIAGNOSIS — R26.2 DIFFICULTY WALKING: ICD-10-CM

## 2024-01-23 PROCEDURE — 97110 THERAPEUTIC EXERCISES: CPT | Mod: GP | Performed by: PHYSICAL THERAPIST

## 2024-01-23 PROCEDURE — 97016 VASOPNEUMATIC DEVICE THERAPY: CPT | Mod: GP | Performed by: PHYSICAL THERAPIST

## 2024-01-23 PROCEDURE — 97140 MANUAL THERAPY 1/> REGIONS: CPT | Mod: GP | Performed by: PHYSICAL THERAPIST

## 2024-01-23 ASSESSMENT — PAIN - FUNCTIONAL ASSESSMENT: PAIN_FUNCTIONAL_ASSESSMENT: 0-10

## 2024-01-23 ASSESSMENT — PAIN SCALES - GENERAL: PAINLEVEL_OUTOF10: 1

## 2024-01-23 NOTE — PROGRESS NOTES
Physical Therapy  Physical Therapy Orthopedic Evaluation    Patient Name: Jhony Beyer  MRN: [id number]  Today's Date: 1/23/2024  Time Calculation  Start Time: 0700  Stop Time: 0805  Time Calculation (min): 65 min    Insurance:  Visit number: 6 (18 total) of 60  Authorization info: no auth  Insurance Type: [employer] [place]    General:  Reason for visit: Right Robert TKA   Referred by: outside provider    Current Problem  1. Status post right partial knee replacement        2. Orthopedic aftercare        3. Difficulty walking        4. Knee stiffness, right        5. Acute pain of right knee        6. Right knee pain                  Precautions: none       Medical History Form: Reviewed (scanned into chart)    Subjective:   Onset Date: 11/6/2023  Pt arrived to therapy reporting overall he is doing much better than previous session. Pt reporting Thursday, Friday and Saturday were all good days. Pt reports compliance with HEP    Current Condition:   Better    Pain:  Pain Assessment: 0-10  Pain Score: 1      Objective:  Objective       ROM    Observations:    Mild edema suprapatellar sac and lateral knee (IT band insertion) but less  than previous session  Pain with palpation to adductor insertion  Knee AROM (Degrees)      (R)  (L)  Flexion: 125  130      Extension: +5  0         Knee PROM (Degrees)      (R)  (L)  Flexion: 125  130      Extension: 0  0              Goals: Set and discussed today  Active       PT Problem       PT Goal 1       Start:  11/29/23    Expected End:  01/24/24       Demonstrate independence with home exercise program  Tolerate increased exercise without adverse reaction  Demonstrate improved posture & proper body mechanics throughout session  Increase right knee ROM to 0-125 pain free  Increase RLE strength to 5/5  Report decrease in pain by > 2 points to meet the MCID  Increase score of LEFS by > 9 points to meet the MCID  Decrease time on 5x sit to stand test by > 2.3 sec to meet  the MCID  Perform ADLs without an increase symptoms  Ascend / descend stairs without an increase in symptoms  Ambulate x 1000' without an increase in symptoms  Pt. will be able to sleep through the night without an increase in symptoms                 Plan of care was developed with input and agreement by the patient      Treatment Performed:    Therapeutic Exercise:    40min  Upright bike x7'  BFR strength protocol   DL Glute bridge with ball squeeze 30-15-15-15  Clamshells 30-15-15-15  Knee extension isometric 3' holds 30-15-15-15  Plinth squats 30-15-15-15      Manual Therapy:    15  STM to knee area to help facilitate fluid motion for edema    Vaso:    10 min  34 deg, min compression    Assessment:   Pt had a great session today. Pain is less and less noticeable edema. Pt reports less pain with walking. Hope to progress HEP next session  Plan:   Continue with progression of strength   Planned Interventions include: therapeutic exercise, self-care home management, manual therapy, therapeutic activities, gait training, neuromuscular coordination, vasopneumatic, dry needling, aquatic therapy      Rico Schneider, PT

## 2024-01-25 ENCOUNTER — TREATMENT (OUTPATIENT)
Dept: PHYSICAL THERAPY | Facility: HOSPITAL | Age: 69
End: 2024-01-25
Payer: COMMERCIAL

## 2024-01-25 DIAGNOSIS — M25.561 RIGHT KNEE PAIN: ICD-10-CM

## 2024-01-25 DIAGNOSIS — M25.661 KNEE STIFFNESS, RIGHT: ICD-10-CM

## 2024-01-25 DIAGNOSIS — Z47.89 ORTHOPEDIC AFTERCARE: ICD-10-CM

## 2024-01-25 DIAGNOSIS — R26.2 DIFFICULTY WALKING: ICD-10-CM

## 2024-01-25 DIAGNOSIS — M25.561 ACUTE PAIN OF RIGHT KNEE: ICD-10-CM

## 2024-01-25 DIAGNOSIS — Z96.651 STATUS POST RIGHT PARTIAL KNEE REPLACEMENT: Primary | ICD-10-CM

## 2024-01-25 PROCEDURE — 97110 THERAPEUTIC EXERCISES: CPT | Mod: GP | Performed by: PHYSICAL THERAPIST

## 2024-01-25 PROCEDURE — 97140 MANUAL THERAPY 1/> REGIONS: CPT | Mod: GP | Performed by: PHYSICAL THERAPIST

## 2024-01-25 PROCEDURE — 97016 VASOPNEUMATIC DEVICE THERAPY: CPT | Mod: GP | Performed by: PHYSICAL THERAPIST

## 2024-01-25 ASSESSMENT — PAIN - FUNCTIONAL ASSESSMENT: PAIN_FUNCTIONAL_ASSESSMENT: 0-10

## 2024-01-25 ASSESSMENT — PAIN SCALES - GENERAL: PAINLEVEL_OUTOF10: 1

## 2024-01-25 NOTE — PROGRESS NOTES
Physical Therapy  Physical Therapy Orthopedic Evaluation    Patient Name: Jhony Beyer  MRN: [id number]  Today's Date: 1/25/2024  Time Calculation  Start Time: 0700  Stop Time: 0805  Time Calculation (min): 65 min    Insurance:  Visit number: 6 (18 total) of 60  Authorization info: no auth  Insurance Type: [employer] [place]    General:  Reason for visit: Right Robert TKA   Referred by: outside provider    Current Problem  1. Status post right partial knee replacement        2. Orthopedic aftercare        3. Difficulty walking        4. Knee stiffness, right        5. Acute pain of right knee        6. Right knee pain                  Precautions: none       Medical History Form: Reviewed (scanned into chart)    Subjective:   Onset Date: 11/6/2023  Pt arrived to therapy reporting he felt great the past couple days. Pt reports   Less pain and swelling  Current Condition:   Better    Pain:  Pain Assessment: 0-10  Pain Score: 1      Objective:  Objective       ROM    Observations:    Mild edema suprapatellar sac and lateral knee (IT band insertion) but less  than previous session  Pain with palpation to adductor insertion  Knee AROM (Degrees)      (R)  (L)  Flexion: 125  130      Extension: +5  0         Knee PROM (Degrees)      (R)  (L)  Flexion: 125  130      Extension: 0  0              Goals: Set and discussed today  Active       PT Problem       PT Goal 1       Start:  11/29/23    Expected End:  01/24/24       Demonstrate independence with home exercise program  Tolerate increased exercise without adverse reaction  Demonstrate improved posture & proper body mechanics throughout session  Increase right knee ROM to 0-125 pain free  Increase RLE strength to 5/5  Report decrease in pain by > 2 points to meet the MCID  Increase score of LEFS by > 9 points to meet the MCID  Decrease time on 5x sit to stand test by > 2.3 sec to meet the MCID  Perform ADLs without an increase symptoms  Ascend / descend stairs  "without an increase in symptoms  Ambulate x 1000' without an increase in symptoms  Pt. will be able to sleep through the night without an increase in symptoms                 Plan of care was developed with input and agreement by the patient      Treatment Performed:    Therapeutic Exercise:    40min  Upright bike x7'  Lateral step up 6\" box 3x10  Standing TKE red TB 10x5\" holds x 3 sets  BW squats with BUE support 3x10  Alternating toe taps on 6' box 3x2    Manual Therapy:    15  STM to knee area to help facilitate fluid motion for edema  STM vastus lateralis with tiger tail    Vaso:    10 min  34 deg, min compression    Assessment:   Pt is progressing nicely. Pain and swelling is much improved and we were able to progress to more functional strength today. Pt to monitor pain and swelling and modify as needed  Plan:   Continue with progression of strength   Planned Interventions include: therapeutic exercise, self-care home management, manual therapy, therapeutic activities, gait training, neuromuscular coordination, vasopneumatic, dry needling, aquatic therapy      Rico Schneider, PT   "

## 2024-01-30 ENCOUNTER — TREATMENT (OUTPATIENT)
Dept: PHYSICAL THERAPY | Facility: HOSPITAL | Age: 69
End: 2024-01-30
Payer: COMMERCIAL

## 2024-01-30 DIAGNOSIS — Z47.89 ORTHOPEDIC AFTERCARE: ICD-10-CM

## 2024-01-30 DIAGNOSIS — M25.561 ACUTE PAIN OF RIGHT KNEE: ICD-10-CM

## 2024-01-30 DIAGNOSIS — M25.561 RIGHT KNEE PAIN: ICD-10-CM

## 2024-01-30 DIAGNOSIS — R26.2 DIFFICULTY WALKING: ICD-10-CM

## 2024-01-30 DIAGNOSIS — Z96.651 STATUS POST RIGHT PARTIAL KNEE REPLACEMENT: Primary | ICD-10-CM

## 2024-01-30 DIAGNOSIS — M25.661 KNEE STIFFNESS, RIGHT: ICD-10-CM

## 2024-01-30 PROCEDURE — 97016 VASOPNEUMATIC DEVICE THERAPY: CPT | Mod: GP | Performed by: PHYSICAL THERAPIST

## 2024-01-30 PROCEDURE — 97140 MANUAL THERAPY 1/> REGIONS: CPT | Mod: GP | Performed by: PHYSICAL THERAPIST

## 2024-01-30 PROCEDURE — 97110 THERAPEUTIC EXERCISES: CPT | Mod: GP | Performed by: PHYSICAL THERAPIST

## 2024-01-30 NOTE — PROGRESS NOTES
Physical Therapy  Physical Therapy Orthopedic Evaluation    Patient Name: Jhony Beyer  MRN: [id number]  Today's Date: 1/25/2024  Time Calculation  Start Time: 0700  Stop Time: 0805  Time Calculation (min): 65 min    Insurance:  Visit number: 6 (18 total) of 60  Authorization info: no auth  Insurance Type: [employer] [facility] [place]    General:  Reason for visit: Right Robert TKA   Referred by: outside provider    Current Problem  1. Status post right partial knee replacement        2. Orthopedic aftercare        3. Difficulty walking        4. Knee stiffness, right        5. Acute pain of right knee        6. Right knee pain                  Precautions: none       Medical History Form: Reviewed (scanned into chart)    Subjective:   Onset Date: 11/6/2023  Pt arrived to therapy reporting he felt great the past couple days. Pt reports this is the best he has felt in 3 weeks. Pt reports compliance with HEP. Pt only issue is stairs continue to be an issue  Less pain and swelling  Current Condition:   Better    Pain:         Objective:      ROM    Observations:    Mild edema suprapatellar sac and lateral knee (IT band insertion) but less  than previous session  Pain with palpation to adductor insertion  Knee AROM (Degrees)      (R)  (L)  Flexion: 125  130      Extension: +5  0         Knee PROM (Degrees)      (R)  (L)  Flexion: 125  130      Extension: 0  0              Goals: Set and discussed today  Active       PT Problem       PT Goal 1       Start:  11/29/23    Expected End:  01/24/24       Demonstrate independence with home exercise program  Tolerate increased exercise without adverse reaction  Demonstrate improved posture & proper body mechanics throughout session  Increase right knee ROM to 0-125 pain free  Increase RLE strength to 5/5  Report decrease in pain by > 2 points to meet the MCID  Increase score of LEFS by > 9 points to meet the MCID  Decrease time on 5x sit to stand test by > 2.3 sec to meet the  "MCID  Perform ADLs without an increase symptoms  Ascend / descend stairs without an increase in symptoms  Ambulate x 1000' without an increase in symptoms  Pt. will be able to sleep through the night without an increase in symptoms                 Plan of care was developed with input and agreement by the patient      Treatment Performed:    Therapeutic Exercise:    40min  Upright bike x6'  Lateral step up 6\" box 3x15  Total gym DL press, seat 1, 1 yellow band resistance 3x12   Goblet squat to plinth with 20# DB 3x10  Standing TKE red TB 3x10 5\" holds  Sled Pushes 6x20 yards (10 yards down and back) 25#  4 way standing star toe taps 3x10  SL balance with 4# ball toss to rebounder [id number]     Manual Therapy:    15  STM to knee area to help facilitate fluid motion for edema  STM vastus lateralis with tiger tail    Vaso:    10 min  34 deg, min compression    Assessment:   Pt was able to tolerated PREs well by denying increase in pain throughout. Pt states today is the best he has felt in 3 weeks. We will continue to progress strength and stability to improve overall function    Plan:  Continue with progression of strength   Planned Interventions include: therapeutic exercise, self-care home management, manual therapy, therapeutic activities, gait training, neuromuscular coordination, vasopneumatic, dry needling, aquatic therapy      Rico Schneider, PT   "

## 2024-02-01 ENCOUNTER — APPOINTMENT (OUTPATIENT)
Dept: PRIMARY CARE | Facility: CLINIC | Age: 69
End: 2024-02-01
Payer: COMMERCIAL

## 2024-02-01 ENCOUNTER — TREATMENT (OUTPATIENT)
Dept: PHYSICAL THERAPY | Facility: HOSPITAL | Age: 69
End: 2024-02-01
Payer: COMMERCIAL

## 2024-02-01 DIAGNOSIS — M25.561 RIGHT KNEE PAIN: ICD-10-CM

## 2024-02-01 DIAGNOSIS — Z47.89 ORTHOPEDIC AFTERCARE: ICD-10-CM

## 2024-02-01 DIAGNOSIS — Z96.651 STATUS POST RIGHT PARTIAL KNEE REPLACEMENT: Primary | ICD-10-CM

## 2024-02-01 DIAGNOSIS — M25.661 KNEE STIFFNESS, RIGHT: ICD-10-CM

## 2024-02-01 DIAGNOSIS — M25.561 ACUTE PAIN OF RIGHT KNEE: ICD-10-CM

## 2024-02-01 DIAGNOSIS — R26.2 DIFFICULTY WALKING: ICD-10-CM

## 2024-02-01 PROCEDURE — 97140 MANUAL THERAPY 1/> REGIONS: CPT | Mod: GP | Performed by: PHYSICAL THERAPIST

## 2024-02-01 PROCEDURE — 97016 VASOPNEUMATIC DEVICE THERAPY: CPT | Mod: GP | Performed by: PHYSICAL THERAPIST

## 2024-02-01 PROCEDURE — 97110 THERAPEUTIC EXERCISES: CPT | Mod: GP | Performed by: PHYSICAL THERAPIST

## 2024-02-01 NOTE — PROGRESS NOTES
Physical Therapy  Physical Therapy Orthopedic Evaluation    Patient Name: Jhony Beyer  MRN: [id number]  Today's Date: 1/25/2024  Time Calculation  Start Time: 0700  Stop Time: 0810  Time Calculation (min): 70 min    Insurance:  Visit number: 6 (18 total) of 60  Authorization info: no auth  Insurance Type: [employer] [facility] [place]    General:  Reason for visit: Right Robert TKA   Referred by: outside provider    Current Problem  1. Status post right partial knee replacement        2. Orthopedic aftercare        3. Difficulty walking        4. Knee stiffness, right        5. Acute pain of right knee        6. Right knee pain                  Precautions: none       Medical History Form: Reviewed (scanned into chart)    Subjective:   Onset Date: 11/6/2023  Pt arrived to therapy reporting he was a little sore with exercises yesterday but is better today. Pt reports overall he is feeling better  Current Condition:   Better    Pain:         Objective:      ROM    Observations:    Mild edema suprapatellar sac and lateral knee (IT band insertion) but less  than previous session  Pain with palpation to adductor insertion  Knee AROM (Degrees)      (R)  (L)  Flexion: 125  130      Extension: +5  0         Knee PROM (Degrees)      (R)  (L)  Flexion: 125  130      Extension: 0  0              Goals: Set and discussed today  Active       PT Problem       PT Goal 1       Start:  11/29/23    Expected End:  01/24/24       Demonstrate independence with home exercise program  Tolerate increased exercise without adverse reaction  Demonstrate improved posture & proper body mechanics throughout session  Increase right knee ROM to 0-125 pain free  Increase RLE strength to 5/5  Report decrease in pain by > 2 points to meet the MCID  Increase score of LEFS by > 9 points to meet the MCID  Decrease time on 5x sit to stand test by > 2.3 sec to meet the MCID  Perform ADLs without an increase symptoms  Ascend / descend stairs without an  "increase in symptoms  Ambulate x 1000' without an increase in symptoms  Pt. will be able to sleep through the night without an increase in symptoms                 Plan of care was developed with input and agreement by the patient      Treatment Performed:    Therapeutic Exercise:    40min  Upright bike x6'  Fwd step up 4\" box  BFR strength protocol   DL Glute bridge with ball squeeze 30-15-15-15  Clamshells 5# 30-15-15-15  Knee extension 2# 3' holds 30-15-15-15  Plinth squats 30-15-15-15    Manual Therapy:    15  STM to knee area to help facilitate fluid motion for edema    Vaso:    10 min  34 deg, min compression    Assessment:   Pt is progressing nicely towards goals. Pt reports less pain and demonstrated improved strength with fwd step ups. Pt would continue to benefit from further therapy to reach all established goals    Plan:  Continue with progression of strength   Planned Interventions include: therapeutic exercise, self-care home management, manual therapy, therapeutic activities, gait training, neuromuscular coordination, vasopneumatic, dry needling, aquatic therapy      Rico Schneider, PT   "

## 2024-02-06 ENCOUNTER — TREATMENT (OUTPATIENT)
Dept: PHYSICAL THERAPY | Facility: HOSPITAL | Age: 69
End: 2024-02-06
Payer: COMMERCIAL

## 2024-02-06 DIAGNOSIS — M25.561 ACUTE PAIN OF RIGHT KNEE: ICD-10-CM

## 2024-02-06 DIAGNOSIS — Z47.89 ORTHOPEDIC AFTERCARE: ICD-10-CM

## 2024-02-06 DIAGNOSIS — R26.2 DIFFICULTY WALKING: ICD-10-CM

## 2024-02-06 DIAGNOSIS — Z96.651 STATUS POST RIGHT PARTIAL KNEE REPLACEMENT: Primary | ICD-10-CM

## 2024-02-06 DIAGNOSIS — M25.661 KNEE STIFFNESS, RIGHT: ICD-10-CM

## 2024-02-06 DIAGNOSIS — M25.561 RIGHT KNEE PAIN: ICD-10-CM

## 2024-02-06 PROCEDURE — 97110 THERAPEUTIC EXERCISES: CPT | Mod: GP | Performed by: PHYSICAL THERAPIST

## 2024-02-06 PROCEDURE — 97016 VASOPNEUMATIC DEVICE THERAPY: CPT | Mod: GP | Performed by: PHYSICAL THERAPIST

## 2024-02-06 ASSESSMENT — PAIN - FUNCTIONAL ASSESSMENT: PAIN_FUNCTIONAL_ASSESSMENT: 0-10

## 2024-02-06 ASSESSMENT — PAIN SCALES - GENERAL: PAINLEVEL_OUTOF10: 2

## 2024-02-06 NOTE — PROGRESS NOTES
Physical Therapy  Physical Therapy Orthopedic Evaluation    Patient Name: Jhony Beyer  MRN: [id number]  Today's Date: 02/06/24  Time Calculation  Start Time: 0800  Stop Time: 0905  Time Calculation (min): 65 min    Insurance:  Visit number: 10 of 60  Authorization info: no auth  Insurance Type: [employer] [place]    General:  Reason for visit: Right Robert TKA   Referred by: outside provider    Current Problem  1. Status post right partial knee replacement        2. Orthopedic aftercare        3. Difficulty walking        4. Knee stiffness, right        5. Acute pain of right knee        6. Right knee pain              Precautions: none       Medical History Form: Reviewed (scanned into chart)    Subjective:   Onset Date: 11/6/2023  Pt arrived to therapy reporting he wasn't as sore following his previous session even with our moderate increase in intensity. Pt reports overall he is feeling better  Current Condition:   Better    Pain:  Pain Assessment: 0-10  Pain Score: 2      Objective:      ROM    Observations:    Mild edema suprapatellar sac and lateral knee (IT band insertion) but less  than previous session  Pain with palpation to adductor insertion  Knee AROM (Degrees)      (R)  (L)  Flexion: 125  130      Extension: +5  0         Knee PROM (Degrees)      (R)  (L)  Flexion: 125  130      Extension: 0  0              Goals: Set and discussed today  Active       PT Problem       PT Goal 1       Start:  11/29/23    Expected End:  01/24/24       Demonstrate independence with home exercise program  Tolerate increased exercise without adverse reaction  Demonstrate improved posture & proper body mechanics throughout session  Increase right knee ROM to 0-125 pain free  Increase RLE strength to 5/5  Report decrease in pain by > 2 points to meet the MCID  Increase score of LEFS by > 9 points to meet the MCID  Decrease time on 5x sit to stand test by > 2.3 sec to meet the MCID  Perform ADLs without an increase  "symptoms  Ascend / descend stairs without an increase in symptoms  Ambulate x 1000' without an increase in symptoms  Pt. will be able to sleep through the night without an increase in symptoms                 Plan of care was developed with input and agreement by the patient      Treatment Performed:    Therapeutic Exercise:    55min  Upright bike x6'  BFR strength protocol   (80%)  LAQ with 7.5#  Forward step up onto 4\" box  DL heel raises  Total gym DL press, seat 1, 1 yellow band resistance  Sled pushes 4x10 yards (down and back) 2x25#, 2x45#    Manual Therapy:    NT  STM to knee area to help facilitate fluid motion for edema    Vaso:    10 min  107 deg, medium compression      Not Today:  BFR DL Glute bridge with ball squeeze 30-15-15-15  BFR Clamshells 5# 30-15-15-15  BFR Plinth squats 30-15-15-15  Fwd step up 4\" box    Assessment:   Pt is progressing nicely towards goals. Pt reports less pain and demonstrated quality movement patterns with increase in BFR intensity and denies increase in pain. Pt would continue to benefit from further therapy to reach all established goals    Plan:  Continue with progression of strength.   Planned Interventions include: therapeutic exercise, self-care home management, manual therapy, therapeutic activities, gait training, neuromuscular coordination, vasopneumatic, dry needling, aquatic therapy      Rico Schneider, PT   "

## 2024-02-15 ENCOUNTER — TREATMENT (OUTPATIENT)
Dept: PHYSICAL THERAPY | Facility: HOSPITAL | Age: 69
End: 2024-02-15
Payer: COMMERCIAL

## 2024-02-15 DIAGNOSIS — M25.661 KNEE STIFFNESS, RIGHT: ICD-10-CM

## 2024-02-15 DIAGNOSIS — Z96.651 STATUS POST RIGHT PARTIAL KNEE REPLACEMENT: Primary | ICD-10-CM

## 2024-02-15 DIAGNOSIS — R26.2 DIFFICULTY WALKING: ICD-10-CM

## 2024-02-15 DIAGNOSIS — M25.561 ACUTE PAIN OF RIGHT KNEE: ICD-10-CM

## 2024-02-15 DIAGNOSIS — Z47.89 ORTHOPEDIC AFTERCARE: ICD-10-CM

## 2024-02-15 DIAGNOSIS — M25.561 RIGHT KNEE PAIN: ICD-10-CM

## 2024-02-15 PROCEDURE — 97110 THERAPEUTIC EXERCISES: CPT | Mod: GP | Performed by: PHYSICAL THERAPIST

## 2024-02-15 PROCEDURE — 97016 VASOPNEUMATIC DEVICE THERAPY: CPT | Mod: GP | Performed by: PHYSICAL THERAPIST

## 2024-02-15 ASSESSMENT — PAIN - FUNCTIONAL ASSESSMENT: PAIN_FUNCTIONAL_ASSESSMENT: 0-10

## 2024-02-15 ASSESSMENT — PAIN SCALES - GENERAL: PAINLEVEL_OUTOF10: 1

## 2024-02-16 NOTE — PROGRESS NOTES
Physical Therapy  Physical Therapy Orthopedic Evaluation    Patient Name: Jhony Beyer  MRN: [id number]  Today's Date: 2/15/2024       Insurance:  Visit number: 11 of 60  Authorization info: no auth  Insurance Type: [employer] [place]    General:  Reason for visit: Right Robert TKA   Referred by: outside provider    Current Problem  1. Status post right partial knee replacement        2. Orthopedic aftercare        3. Difficulty walking        4. Knee stiffness, right        5. Acute pain of right knee        6. Right knee pain                Precautions: none       Medical History Form: Reviewed (scanned into chart)    Subjective:   Onset Date: 11/6/2023  Pt arrived to therapy reporting he continues to see improvements every week. Pt reports he continues to have pain with steps  Current Condition:   Better    Pain:  Pain Assessment: 0-10  Pain Score: 1      Objective:      ROM    Observations:    Mild edema suprapatellar sac and lateral knee (IT band insertion) but less  than previous session  Pain with palpation to adductor insertion  Knee AROM (Degrees)      (R)  (L)  Flexion: 125  130      Extension: +5  0         Knee PROM (Degrees)      (R)  (L)  Flexion: 125  130      Extension: 0  0              Goals: Set and discussed today  Active       PT Problem       PT Goal 1       Start:  11/29/23    Expected End:  01/24/24       Demonstrate independence with home exercise program  Tolerate increased exercise without adverse reaction  Demonstrate improved posture & proper body mechanics throughout session  Increase right knee ROM to 0-125 pain free  Increase RLE strength to 5/5  Report decrease in pain by > 2 points to meet the MCID  Increase score of LEFS by > 9 points to meet the MCID  Decrease time on 5x sit to stand test by > 2.3 sec to meet the MCID  Perform ADLs without an increase symptoms  Ascend / descend stairs without an increase in symptoms  Ambulate x 1000' without an increase in symptoms  Pt.  "will be able to sleep through the night without an increase in symptoms                 Plan of care was developed with input and agreement by the patient      Treatment Performed:    Therapeutic Exercise:    55min  Upright bike x6'  BFR strength protocol   (80%)  LAQ with 7.5#  Forward step up onto 6\" box  DL heel raises NT  Goblet squat pink KB  Sled pushes 4x10 yards (down and back) 2x25#, 2x45#    Manual Therapy:    NT  STM to knee area to help facilitate fluid motion for edema    Vaso:    10 min  107 deg, medium compression      Not Today:  BFR DL Glute bridge with ball squeeze 30-15-15-15  BFR Clamshells 5# 30-15-15-15  BFR Plinth squats 30-15-15-15  Fwd step up 4\" box    Assessment:   Pt is progressing nicely towards goals. Pt is having less pain with walking but continues to have difficulties with ascending and descending stairs. This is secondary to quad weakness. Pt would continue to benefit from further therapy to reach all established goals    Plan:  Continue with progression of strength.   Planned Interventions include: therapeutic exercise, self-care home management, manual therapy, therapeutic activities, gait training, neuromuscular coordination, vasopneumatic, dry needling, aquatic therapy      Rico Schneider, PT   "

## 2024-02-22 ENCOUNTER — TREATMENT (OUTPATIENT)
Dept: PHYSICAL THERAPY | Facility: HOSPITAL | Age: 69
End: 2024-02-22
Payer: COMMERCIAL

## 2024-02-22 DIAGNOSIS — M25.561 RIGHT KNEE PAIN: ICD-10-CM

## 2024-02-22 DIAGNOSIS — R26.2 DIFFICULTY WALKING: ICD-10-CM

## 2024-02-22 DIAGNOSIS — Z96.651 STATUS POST RIGHT PARTIAL KNEE REPLACEMENT: Primary | ICD-10-CM

## 2024-02-22 DIAGNOSIS — Z47.89 ORTHOPEDIC AFTERCARE: ICD-10-CM

## 2024-02-22 DIAGNOSIS — M25.561 ACUTE PAIN OF RIGHT KNEE: ICD-10-CM

## 2024-02-22 DIAGNOSIS — M25.661 KNEE STIFFNESS, RIGHT: ICD-10-CM

## 2024-02-22 PROCEDURE — 97110 THERAPEUTIC EXERCISES: CPT | Mod: GP | Performed by: PHYSICAL THERAPIST

## 2024-02-22 PROCEDURE — 97016 VASOPNEUMATIC DEVICE THERAPY: CPT | Mod: GP | Performed by: PHYSICAL THERAPIST

## 2024-02-22 ASSESSMENT — PAIN SCALES - GENERAL: PAINLEVEL_OUTOF10: 0 - NO PAIN

## 2024-02-22 ASSESSMENT — PAIN - FUNCTIONAL ASSESSMENT: PAIN_FUNCTIONAL_ASSESSMENT: 0-10

## 2024-02-22 NOTE — PROGRESS NOTES
Physical Therapy  Physical Therapy Orthopedic Evaluation    Patient Name: Jhony Beyer  MRN: [id number]  Today's Date: 2/22/2024  Time Calculation  Start Time: 0700  Stop Time: 0810  Time Calculation (min): 70 min    Insurance:  Visit number: 12 of 60  Authorization info: no auth  Insurance Type: [employer] [place]    General:  Reason for visit: Right Robert TKA   Referred by: outside provider    Current Problem  1. Status post right partial knee replacement        2. Orthopedic aftercare        3. Difficulty walking        4. Knee stiffness, right        5. Acute pain of right knee        6. Right knee pain                  Precautions: none       Medical History Form: Reviewed (scanned into chart)    Subjective:   Onset Date: 11/6/2023  Pt arrived to therapy reporting he is feeling great. Pt reports less pain with stairs  Current Condition:   Better    Pain:  Pain Assessment: 0-10  Pain Score: 0 - No pain      Objective:      ROM      (R)  (L)  Flexion: 125  130      Extension: +5  0         Knee PROM (Degrees)      (R)  (L)  Flexion: 125  130      Extension: 0  0              Goals: Set and discussed today  Active       PT Problem       PT Goal 1       Start:  11/29/23    Expected End:  01/24/24       Demonstrate independence with home exercise program  Tolerate increased exercise without adverse reaction  Demonstrate improved posture & proper body mechanics throughout session  Increase right knee ROM to 0-125 pain free  Increase RLE strength to 5/5  Report decrease in pain by > 2 points to meet the MCID  Increase score of LEFS by > 9 points to meet the MCID  Decrease time on 5x sit to stand test by > 2.3 sec to meet the MCID  Perform ADLs without an increase symptoms  Ascend / descend stairs without an increase in symptoms  Ambulate x 1000' without an increase in symptoms  Pt. will be able to sleep through the night without an increase in symptoms                 Plan of care was developed with input and  Patient called back. He would like order faxed to [facility] [phone] fax # [phone]. Referral faxed.   "agreement by the patient      Treatment Performed:    Therapeutic Exercise:    55min  Upright bike x6'  BFR strength protocol   (80%)  LAQ with 5#  Forward step up onto 6\" box  DL heel raises   Goblet squat pink KB  Kieser chops 6# 3x20  Sled pushes 100# 10 yards x6    Manual Therapy:    NT  STM to knee area to help facilitate fluid motion for edema    Vaso:    10 min  107 deg, medium compression      Not Today:  BFR DL Glute bridge with ball squeeze 30-15-15-15  BFR Clamshells 5# 30-15-15-15  BFR Plinth squats 30-15-15-15  Fwd step up 4\" box    Assessment:   Pt is progressing nicely towards goals. Pt is demonstrating improved strength by being able to push more weight on sled. Pt denied pain with functional rotations. Pt would continue to benefit from further therapy to reach all established goals    Plan:  Continue with progression of strength.   Planned Interventions include: therapeutic exercise, self-care home management, manual therapy, therapeutic activities, gait training, neuromuscular coordination, vasopneumatic, dry needling, aquatic therapy      Rico Schneider, PT   "

## 2024-02-29 ENCOUNTER — TREATMENT (OUTPATIENT)
Dept: PHYSICAL THERAPY | Facility: HOSPITAL | Age: 69
End: 2024-02-29
Payer: COMMERCIAL

## 2024-02-29 DIAGNOSIS — Z96.651 STATUS POST RIGHT PARTIAL KNEE REPLACEMENT: Primary | ICD-10-CM

## 2024-02-29 DIAGNOSIS — R26.2 DIFFICULTY WALKING: ICD-10-CM

## 2024-02-29 DIAGNOSIS — Z47.89 ORTHOPEDIC AFTERCARE: ICD-10-CM

## 2024-02-29 DIAGNOSIS — M25.661 KNEE STIFFNESS, RIGHT: ICD-10-CM

## 2024-02-29 DIAGNOSIS — M25.561 ACUTE PAIN OF RIGHT KNEE: ICD-10-CM

## 2024-02-29 DIAGNOSIS — M25.561 RIGHT KNEE PAIN: ICD-10-CM

## 2024-02-29 PROCEDURE — 97110 THERAPEUTIC EXERCISES: CPT | Mod: GP | Performed by: PHYSICAL THERAPIST

## 2024-02-29 PROCEDURE — 97016 VASOPNEUMATIC DEVICE THERAPY: CPT | Mod: GP | Performed by: PHYSICAL THERAPIST

## 2024-02-29 ASSESSMENT — PAIN SCALES - GENERAL: PAINLEVEL_OUTOF10: 0 - NO PAIN

## 2024-02-29 ASSESSMENT — PAIN - FUNCTIONAL ASSESSMENT: PAIN_FUNCTIONAL_ASSESSMENT: 0-10

## 2024-02-29 NOTE — PROGRESS NOTES
Physical Therapy  Physical Therapy Orthopedic Evaluation    Patient Name: Jhony Beyer  MRN: [id number]  Today's Date: 3/1/2024  Time Calculation  Start Time: 0800  Stop Time: 0900  Time Calculation (min): 60 min    Insurance:  Visit number: 13 of 60  Authorization info: no auth  Insurance Type: [employer] [place]    General:  Reason for visit: Right Robert TKA   Referred by: outside provider    Current Problem  1. Status post right partial knee replacement        2. Orthopedic aftercare        3. Difficulty walking        4. Knee stiffness, right        5. Acute pain of right knee        6. Right knee pain            Precautions: none       Medical History Form: Reviewed (scanned into chart)    Subjective:   Onset Date: 11/6/2023  Pt arrived to therapy reporting he is feeling great. Pt reports less pain with ascending stairs but does report some continued difficulty with descending the stairs. Ultimately, he would like to get back to [employer].    Current Condition:   Better    Pain:  Pain Assessment: 0-10  Pain Score: 0 - No pain      Objective:      ROM      (R)  (L)  Flexion: 125  130      Extension: +5  0         Knee PROM (Degrees)      (R)  (L)  Flexion: 125  130      Extension: 0  0              Goals: Set and discussed today  Active       PT Problem       PT Goal 1       Start:  11/29/23    Expected End:  01/24/24       Demonstrate independence with home exercise program  Tolerate increased exercise without adverse reaction  Demonstrate improved posture & proper body mechanics throughout session  Increase right knee ROM to 0-125 pain free  Increase RLE strength to 5/5  Report decrease in pain by > 2 points to meet the MCID  Increase score of LEFS by > 9 points to meet the MCID  Decrease time on 5x sit to stand test by > 2.3 sec to meet the MCID  Perform ADLs without an increase symptoms  Ascend / descend stairs without an increase in symptoms  Ambulate x 1000' without an increase in symptoms  Pt. will  "be able to sleep through the night without an increase in symptoms               Plan of care was developed with input and agreement by the patient      Treatment Performed:    Therapeutic Exercise:    50min  Upright bike x6'  Knee flexion stretch on 12' box 2x15  Forward step up onto 12\" box 3x15  4\" heel tap 3x12  Kieser trunk rotations in palloff press position 5# resistance 2x15  Kieser small range golf chip simulation 1# resistance 2x15  Sled push and TRX pulls x4 (down- push, back- pull) 100#  Rogue DL leg press, 4x8 0#  DL knee extension on matrix 4x8 25#    Manual Therapy:    NT  STM to knee area to help facilitate fluid motion for edema    Vaso:    10 min  107 deg, medium compression, 10 min      Not Today:  BFR DL Glute bridge with ball squeeze 30-15-15-15  BFR Clamshells 5# 30-15-15-15  BFR Plinth squats 30-15-15-15  Fwd step up 4\" box  BFR strength protocol   (80%)  LAQ with 5#  Forward step up onto 6\" box  DL heel raises   Goblet squat pink KB  Kieser chops 6# 3x20    Assessment:   Pt is progressing nicely towards goals. Pt is demonstrating improved strength by being able to push more weight on sled. Pt denied pain with functional rotations. Pt would continue to benefit from further therapy to reach all established goals    Plan:  Continue with progression of strength.   Planned Interventions include: therapeutic exercise, self-care home management, manual therapy, therapeutic activities, gait training, neuromuscular coordination, vasopneumatic, dry needling, aquatic therapy      Rico Schneider, PT   "

## 2024-03-07 ENCOUNTER — TREATMENT (OUTPATIENT)
Dept: PHYSICAL THERAPY | Facility: HOSPITAL | Age: 69
End: 2024-03-07
Payer: COMMERCIAL

## 2024-03-07 DIAGNOSIS — M25.561 ACUTE PAIN OF RIGHT KNEE: ICD-10-CM

## 2024-03-07 DIAGNOSIS — M25.561 RIGHT KNEE PAIN: ICD-10-CM

## 2024-03-07 DIAGNOSIS — M25.661 KNEE STIFFNESS, RIGHT: ICD-10-CM

## 2024-03-07 DIAGNOSIS — R26.2 DIFFICULTY WALKING: ICD-10-CM

## 2024-03-07 DIAGNOSIS — Z47.89 ORTHOPEDIC AFTERCARE: ICD-10-CM

## 2024-03-07 DIAGNOSIS — Z96.651 STATUS POST RIGHT PARTIAL KNEE REPLACEMENT: Primary | ICD-10-CM

## 2024-03-07 PROCEDURE — 97016 VASOPNEUMATIC DEVICE THERAPY: CPT | Mod: GP | Performed by: PHYSICAL THERAPIST

## 2024-03-07 PROCEDURE — 97110 THERAPEUTIC EXERCISES: CPT | Mod: GP | Performed by: PHYSICAL THERAPIST

## 2024-03-08 NOTE — PROGRESS NOTES
Physical Therapy  Physical Therapy Orthopedic Evaluation    Patient Name: Jhony Beyer  MRN: [id number]  Today's Date: 3/7/2024  Time Calculation  Start Time: 0700  Stop Time: 0800  Time Calculation (min): 60 min    Insurance:  Visit number: 14 of 60  Authorization info: no auth  Insurance Type: [employer] [place]    General:  Reason for visit: Right Robert TKA   Referred by: outside provider    Current Problem  1. Status post right partial knee replacement        2. Orthopedic aftercare        3. Difficulty walking        4. Knee stiffness, right        5. Acute pain of right knee        6. Right knee pain              Precautions: none       Medical History Form: Reviewed (scanned into chart)    Subjective:   Onset Date: 11/6/2023  Pt arrived to therapy reporting he is feeling great. Pt denies any new complaints    Current Condition:   Better    Pain:         Objective:      ROM      (R)  (L)  Flexion: 125  130      Extension: +5  0         Knee PROM (Degrees)      (R)  (L)  Flexion: 125  130      Extension: 0  0              Goals: Set and discussed today  Active       PT Problem       PT Goal 1       Start:  11/29/23    Expected End:  01/24/24       Demonstrate independence with home exercise program  Tolerate increased exercise without adverse reaction  Demonstrate improved posture & proper body mechanics throughout session  Increase right knee ROM to 0-125 pain free  Increase RLE strength to 5/5  Report decrease in pain by > 2 points to meet the MCID  Increase score of LEFS by > 9 points to meet the MCID  Decrease time on 5x sit to stand test by > 2.3 sec to meet the MCID  Perform ADLs without an increase symptoms  Ascend / descend stairs without an increase in symptoms  Ambulate x 1000' without an increase in symptoms  Pt. will be able to sleep through the night without an increase in symptoms               Plan of care was developed with input and agreement by the patient      Treatment  "Performed:    Therapeutic Exercise:    50min  Upright bike x6'  Knee flexion stretch on 12' box 2x15  Forward step up onto 12\" box 3x15  4\" heel tap 3x12  Kieser trunk rotations in palloff press position 5# resistance 2x15  Kieser small range golf chip simulation 1# resistance 2x15  Sled push and TRX pulls x4 (down- push, back- pull) 100#  Rogue DL leg press, 4x8 0#  DL knee extension on matrix 4x8 25#    Manual Therapy:    NT  STM to knee area to help facilitate fluid motion for edema    Vaso:    10 min  107 deg, medium compression, 10 min      Not Today:  BFR DL Glute bridge with ball squeeze 30-15-15-15  BFR Clamshells 5# 30-15-15-15  BFR Plinth squats 30-15-15-15  Fwd step up 4\" box  BFR strength protocol   (80%)  LAQ with 5#  Forward step up onto 6\" box  DL heel raises   Goblet squat pink KB  Kieser chops 6# 3x20    Assessment:   Pt is going to hit some golf balls this weekend and we will assess tolerance. Planned discharge by end of the month    Plan:  Continue with progression of strength.   Planned Interventions include: therapeutic exercise, self-care home management, manual therapy, therapeutic activities, gait training, neuromuscular coordination, vasopneumatic, dry needling, aquatic therapy      Rico Schneider, PT   "

## 2024-03-14 ENCOUNTER — TREATMENT (OUTPATIENT)
Dept: PHYSICAL THERAPY | Facility: HOSPITAL | Age: 69
End: 2024-03-14
Payer: COMMERCIAL

## 2024-03-14 DIAGNOSIS — M25.561 RIGHT KNEE PAIN: ICD-10-CM

## 2024-03-14 DIAGNOSIS — Z47.89 ORTHOPEDIC AFTERCARE: ICD-10-CM

## 2024-03-14 DIAGNOSIS — M25.661 KNEE STIFFNESS, RIGHT: ICD-10-CM

## 2024-03-14 DIAGNOSIS — R26.2 DIFFICULTY WALKING: ICD-10-CM

## 2024-03-14 DIAGNOSIS — Z96.651 STATUS POST RIGHT PARTIAL KNEE REPLACEMENT: Primary | ICD-10-CM

## 2024-03-14 DIAGNOSIS — M25.561 ACUTE PAIN OF RIGHT KNEE: ICD-10-CM

## 2024-03-14 PROCEDURE — 97110 THERAPEUTIC EXERCISES: CPT | Mod: GP | Performed by: PHYSICAL THERAPIST

## 2024-03-14 PROCEDURE — 97016 VASOPNEUMATIC DEVICE THERAPY: CPT | Mod: GP | Performed by: PHYSICAL THERAPIST

## 2024-03-14 ASSESSMENT — PAIN - FUNCTIONAL ASSESSMENT: PAIN_FUNCTIONAL_ASSESSMENT: 0-10

## 2024-03-14 ASSESSMENT — PAIN SCALES - GENERAL: PAINLEVEL_OUTOF10: 0 - NO PAIN

## 2024-03-14 NOTE — PROGRESS NOTES
Physical Therapy  Physical Therapy Orthopedic Evaluation    Patient Name: Jhony Beyer  MRN: [id number]  Today's Date: 3/14/2024  Time Calculation  Start Time: 0700  Stop Time: 0800  Time Calculation (min): 60 min    Insurance:  Visit number: 14 of 60  Authorization info: no auth  Insurance Type: [employer] [place]    General:  Reason for visit: Right Robert TKA   Referred by: outside provider    Current Problem  1. Status post right partial knee replacement        2. Orthopedic aftercare        3. Difficulty walking        4. Knee stiffness, right        5. Acute pain of right knee        6. Right knee pain              Precautions: none       Medical History Form: Reviewed (scanned into chart)    Subjective:   Onset Date: 11/6/2023  Pt arrived to therapy reporting he is feeling good overall. Pt was not able to golf. Pt reports stairs are going better    Current Condition:   Better    Pain:  Pain Assessment: 0-10  Pain Score: 0 - No pain      Objective:      ROM      (R)  (L)  Flexion: 125  130      Extension: +5  0         Knee PROM (Degrees)      (R)  (L)  Flexion: 125  130      Extension: 0  0              Goals: Set and discussed today  Active       PT Problem       PT Goal 1       Start:  11/29/23    Expected End:  01/24/24       Demonstrate independence with home exercise program  Tolerate increased exercise without adverse reaction  Demonstrate improved posture & proper body mechanics throughout session  Increase right knee ROM to 0-125 pain free  Increase RLE strength to 5/5  Report decrease in pain by > 2 points to meet the MCID  Increase score of LEFS by > 9 points to meet the MCID  Decrease time on 5x sit to stand test by > 2.3 sec to meet the MCID  Perform ADLs without an increase symptoms  Ascend / descend stairs without an increase in symptoms  Ambulate x 1000' without an increase in symptoms  Pt. will be able to sleep through the night without an increase in symptoms               Plan of  "care was developed with input and agreement by the patient      Treatment Performed:    Therapeutic Exercise:    50min  Upright bike x6'  Knee flexion stretch on 8' box 2x15  Forward step up onto 12\" box 3x15  4\" heel tap 3x12  Kieser trunk rotations in palloff press position 5# resistance 2x15  Kieser small range golf chip simulation 1# resistance 2x15  Sled push and TRX pulls x5 (down- push, back- pull) 100#  Total gym 2 cords SL leg press, 4x8 0#  DL knee extension on matrix 4x8 25# NT    Manual Therapy:    NT  STM to knee area to help facilitate fluid motion for edema    Vaso:    10 min  107 deg, medium compression, 10 min      Not Today:  BFR DL Glute bridge with ball squeeze 30-15-15-15  BFR Clamshells 5# 30-15-15-15  BFR Plinth squats 30-15-15-15  Fwd step up 4\" box  BFR strength protocol   (80%)  LAQ with 5#  Forward step up onto 6\" box  DL heel raises   Goblet squat pink KB  Kieser chops 6# 3x20    Assessment:   Pt is doing great. Pt was challenged today with exercises but denied pain throughout. Pt to hit some golf balls this weekend    Plan:  Continue with progression of strength.   Planned Interventions include: therapeutic exercise, self-care home management, manual therapy, therapeutic activities, gait training, neuromuscular coordination, vasopneumatic, dry needling, aquatic therapy      Rico Schneider, PT   "

## 2024-03-21 ENCOUNTER — TREATMENT (OUTPATIENT)
Dept: PHYSICAL THERAPY | Facility: HOSPITAL | Age: 69
End: 2024-03-21
Payer: COMMERCIAL

## 2024-03-21 DIAGNOSIS — Z47.89 ORTHOPEDIC AFTERCARE: ICD-10-CM

## 2024-03-21 DIAGNOSIS — M25.561 ACUTE PAIN OF RIGHT KNEE: ICD-10-CM

## 2024-03-21 DIAGNOSIS — R26.2 DIFFICULTY WALKING: ICD-10-CM

## 2024-03-21 DIAGNOSIS — Z96.651 STATUS POST RIGHT PARTIAL KNEE REPLACEMENT: Primary | ICD-10-CM

## 2024-03-21 DIAGNOSIS — M25.661 KNEE STIFFNESS, RIGHT: ICD-10-CM

## 2024-03-21 PROCEDURE — 97110 THERAPEUTIC EXERCISES: CPT | Mod: GP | Performed by: PHYSICAL THERAPIST

## 2024-03-21 PROCEDURE — 97016 VASOPNEUMATIC DEVICE THERAPY: CPT | Mod: GP | Performed by: PHYSICAL THERAPIST

## 2024-03-21 ASSESSMENT — PAIN - FUNCTIONAL ASSESSMENT: PAIN_FUNCTIONAL_ASSESSMENT: 0-10

## 2024-03-21 ASSESSMENT — PAIN SCALES - GENERAL: PAINLEVEL_OUTOF10: 0 - NO PAIN

## 2024-03-21 NOTE — PROGRESS NOTES
Physical Therapy  Physical Therapy Orthopedic Evaluation    Patient Name: Jhony Beyer  MRN: [id number]  Today's Date: 3/21/2024  Time Calculation  Start Time: 0700  Stop Time: 0755  Time Calculation (min): 55    Insurance:  Visit number: 15 of 60  Authorization info: no auth  Insurance Type: [employer] [place]    General:  Reason for visit: Right Robert TKA   Referred by: outside provider    Current Problem  1. Status post right partial knee replacement        2. Orthopedic aftercare        3. Difficulty walking        4. Knee stiffness, right        5. Acute pain of right knee        6. Right knee pain              Precautions: none       Medical History Form: Reviewed (scanned into chart)    Subjective:   Onset Date: 11/6/2023  Pt arrived to therapy reporting he was able to hit short chips and short shots since he previous visit. Patient described a half backswing with a half following through for short chips and half backswing with a full follow through during golf activity. Patient reports he plans on walking 9 holes and riding in a cart during the second 9 holes.     Current Condition:   Better    Pain:  Pain Assessment: 0-10  Pain Score: 0 - No pain      Objective:      ROM      (R)  (L)  Flexion: 125  130      Extension: +5  0         Knee PROM (Degrees)      (R)  (L)  Flexion: 125  130      Extension: 0  0              Goals: Set and discussed today  Active       PT Problem       PT Goal 1       Start:  11/29/23    Expected End:  01/24/24       Demonstrate independence with home exercise program  Tolerate increased exercise without adverse reaction  Demonstrate improved posture & proper body mechanics throughout session  Increase right knee ROM to 0-125 pain free  Increase RLE strength to 5/5  Report decrease in pain by > 2 points to meet the MCID  Increase score of LEFS by > 9 points to meet the MCID  Decrease time on 5x sit to stand test by > 2.3 sec to meet the MCID  Perform ADLs without an  "increase symptoms  Ascend / descend stairs without an increase in symptoms  Ambulate x 1000' without an increase in symptoms  Pt. will be able to sleep through the night without an increase in symptoms               Plan of care was developed with input and agreement by the patient      Treatment Performed:    Therapeutic Exercise:    45 min  Upright bike x6'  Kieser Row + Trunk rotation 18# 2x15 each   Kieser rope simulated golf chops #15 3x15  Kieser anti-rotation chops 3x15 15#  Kieser anti-rotation lifts 3x15 10.5#  Sled push and TRX pulls x5 (down- push, back- pull) 100#, 10 yards  Med ball bucket toss 3x8 each  Med ball rotational scoop toss 3x8    Vaso:    10 min  107 deg, medium compression, 10 min      Not Today:  BFR DL Glute bridge with ball squeeze 30-15-15-15  BFR Clamshells 5# 30-15-15-15  BFR Plinth squats 30-15-15-15  Fwd step up 4\" box  BFR strength protocol   (80%)  LAQ with 5#  Forward step up onto 6\" box  DL heel raises   Goblet squat pink KB  Kieser chops 6# 3x20  Knee flexion stretch on 8' box 2x15  Forward step up onto 12\" box 3x15  4\" heel tap 3x12  Kieser trunk rotations in palloff press position 5# resistance 2x15  Kieser small range golf chip simulation 1# resistance 2x15  Sled push and TRX pulls x5 (down- push, back- pull) 100#  Total gym 2 cords SL leg press, 4x8 0#  DL knee extension on matrix 4x8 25# NT    Assessment:   Pt was taken through a series of golf related rotational and anti-rotational exercises during today's session. Patient tolerated all of these exercises well. Patient continues to demonstrate improvements with strength and endurance associated with the RLE. Patient would continue to benefit from skilled PT in order to improve endurance of the RLE to return to golf activity.     Plan:  Continue with progression of strength.   Planned Interventions include: therapeutic exercise, self-care home management, manual therapy, therapeutic activities, gait training, " neuromuscular coordination, vasopneumatic, dry needling, aquatic therapy      Grace Pepe, S-PT

## 2024-03-24 DIAGNOSIS — Z95.1 S/P CABG X 2: Primary | ICD-10-CM

## 2024-03-25 NOTE — TELEPHONE ENCOUNTER
LOV 4/2023 note reviewed. Annual fuv scheduled for next month. Script cued and forwarded to Dr. Garcia to send

## 2024-03-26 RX ORDER — ROSUVASTATIN CALCIUM 40 MG/1
40 TABLET, COATED ORAL DAILY
Qty: 90 TABLET | Refills: 3 | Status: SHIPPED | OUTPATIENT
Start: 2024-03-26

## 2024-03-28 ENCOUNTER — TREATMENT (OUTPATIENT)
Dept: PHYSICAL THERAPY | Facility: HOSPITAL | Age: 69
End: 2024-03-28
Payer: COMMERCIAL

## 2024-03-28 DIAGNOSIS — Z47.89 ORTHOPEDIC AFTERCARE: ICD-10-CM

## 2024-03-28 DIAGNOSIS — M25.561 ACUTE PAIN OF RIGHT KNEE: ICD-10-CM

## 2024-03-28 DIAGNOSIS — Z96.651 STATUS POST RIGHT PARTIAL KNEE REPLACEMENT: Primary | ICD-10-CM

## 2024-03-28 DIAGNOSIS — M25.661 KNEE STIFFNESS, RIGHT: ICD-10-CM

## 2024-03-28 DIAGNOSIS — M25.561 RIGHT KNEE PAIN: ICD-10-CM

## 2024-03-28 DIAGNOSIS — R26.2 DIFFICULTY WALKING: ICD-10-CM

## 2024-03-28 PROCEDURE — 97016 VASOPNEUMATIC DEVICE THERAPY: CPT | Mod: GP | Performed by: PHYSICAL THERAPIST

## 2024-03-28 PROCEDURE — 97110 THERAPEUTIC EXERCISES: CPT | Mod: GP | Performed by: PHYSICAL THERAPIST

## 2024-03-28 ASSESSMENT — PAIN - FUNCTIONAL ASSESSMENT: PAIN_FUNCTIONAL_ASSESSMENT: 0-10

## 2024-03-28 ASSESSMENT — PAIN SCALES - GENERAL: PAINLEVEL_OUTOF10: 0 - NO PAIN

## 2024-03-28 NOTE — PROGRESS NOTES
Physical Therapy  Physical Therapy Orthopedic Evaluation    Patient Name: Jhony Beyer  MRN: [id number]  Today's Date: 3/28/2024  Time Calculation  Start Time: 0708  Stop Time: 0803  Time Calculation (min): 55 min     Insurance:  Visit number: 16 of 60  Authorization info: no auth  Insurance Type: [employer] [place]    General:  Reason for visit: Right Robert TKA   Referred by: outside provider    Current Problem  1. Status post right partial knee replacement        2. Orthopedic aftercare        3. Difficulty walking        4. Knee stiffness, right        5. Acute pain of right knee        6. Right knee pain          Precautions: none       Medical History Form: Reviewed (scanned into chart)    Subjective:   Onset Date: 11/6/2023  Pt arrived to therapy reporting he had been able to complete more full golf swings since the previous session. Patient reports he has been feeling very good and has full confidence in returning to golf activity. Patient reports he has been very pleased with the care he has received.     Current Condition:   Better    Pain:  Pain Assessment: 0-10  Pain Score: 0 - No pain      Objective:      AROM      (R)  (L)  Flexion: 129  133      Extension: 0  +2         Knee PROM (Degrees)      (R)  (L)  Flexion: 135  133      Extension: +5  +4        Strength Testing   Knee                          (R)                    (L)  Flexion:            5/5                   5/5                                             Extension:        4+/5                 5/5                                    Outcome Measures:  Other Measures  Lower Extremity Funtional Score (LEFS): 65/80        Goals: Set and discussed today  Active       PT Problem       PT Goal 1       Start:  11/29/23    Expected End:  01/24/24       Demonstrate independence with home exercise program  Tolerate increased exercise without adverse reaction  Demonstrate improved posture & proper body mechanics throughout session  Increase  "right knee ROM to 0-125 pain free  Increase RLE strength to 5/5  Report decrease in pain by > 2 points to meet the MCID  Increase score of LEFS by > 9 points to meet the MCID  Decrease time on 5x sit to stand test by > 2.3 sec to meet the MCID  Perform ADLs without an increase symptoms  Ascend / descend stairs without an increase in symptoms  Ambulate x 1000' without an increase in symptoms  Pt. will be able to sleep through the night without an increase in symptoms               Plan of care was developed with input and agreement by the patient      Treatment Performed:    Therapeutic Exercise:    45 min  Upright bike x6'  Kieser Row + Trunk rotation 18# 2x15 each   Kieser rope simulated golf chops #15 3x15  Kieser Row + Drop Step + Golf Swing 15# 2x15 each  Sled push and TRX pulls x4 (down- push, back- pull) 100#, 15 yards  Med ball rotational scoop toss 3x8    Vaso:    10 min  107 deg, medium compression, 10 min      Not Today:  BFR DL Glute bridge with ball squeeze 30-15-15-15  BFR Clamshells 5# 30-15-15-15  BFR Plinth squats 30-15-15-15  Fwd step up 4\" box  BFR strength protocol   (80%)  LAQ with 5#  Forward step up onto 6\" box  DL heel raises   Goblet squat pink KB  Kieser chops 6# 3x20  Knee flexion stretch on 8' box 2x15  Forward step up onto 12\" box 3x15  4\" heel tap 3x12  Kieser trunk rotations in palloff press position 5# resistance 2x15  Kieser small range golf chip simulation 1# resistance 2x15  Sled push and TRX pulls x5 (down- push, back- pull) 100#  Total gym 2 cords SL leg press, 4x8 0#  DL knee extension on matrix 4x8 25# NT    Assessment:   Patient tolerated today's treatment session well with no reports of pain or discomfort. No pain complaints with rotational golf exercises. Patient was educated further about functionality of golf exercises. Walking endurance targeted with Rogue Sled Push as the patient anticipates walking 9 holes during each round of golf. Updated ROM, strength " and LEFS outcome measure taken this date. Patient appears to functioning at his baseline with regard to ROM, strength, mobility, and endurance levels. Patient appropriate for discharge at this time.     Plan:  Patient has currently met all established goals at this time. Patient is currently functioning at his baseline and no longer in need of outpatient PT services.    Grace Pepe, S-PT

## 2024-04-11 ENCOUNTER — OFFICE VISIT (OUTPATIENT)
Dept: PRIMARY CARE | Facility: CLINIC | Age: 69
End: 2024-04-11
Payer: COMMERCIAL

## 2024-04-11 VITALS
SYSTOLIC BLOOD PRESSURE: 138 MMHG | DIASTOLIC BLOOD PRESSURE: 84 MMHG | WEIGHT: 214 LBS | HEIGHT: 68 IN | OXYGEN SATURATION: 96 % | BODY MASS INDEX: 32.43 KG/M2 | HEART RATE: 68 BPM

## 2024-04-11 DIAGNOSIS — E03.9 ACQUIRED HYPOTHYROIDISM: ICD-10-CM

## 2024-04-11 DIAGNOSIS — Z00.00 ENCOUNTER FOR PREVENTATIVE ADULT HEALTH CARE EXAMINATION: Primary | ICD-10-CM

## 2024-04-11 DIAGNOSIS — I48.3 TYPICAL ATRIAL FLUTTER (MULTI): ICD-10-CM

## 2024-04-11 DIAGNOSIS — I42.2 APICAL VARIANT HYPERTROPHIC CARDIOMYOPATHY (MULTI): ICD-10-CM

## 2024-04-11 PROBLEM — R06.81 WITNESSED EPISODE OF APNEA: Status: RESOLVED | Noted: 2023-02-15 | Resolved: 2024-04-11

## 2024-04-11 PROBLEM — R60.0 LOWER EXTREMITY EDEMA: Status: RESOLVED | Noted: 2023-08-01 | Resolved: 2024-04-11

## 2024-04-11 PROBLEM — G47.33 OSA (OBSTRUCTIVE SLEEP APNEA): Status: RESOLVED | Noted: 2023-02-15 | Resolved: 2024-04-11

## 2024-04-11 PROBLEM — J45.20 MILD INTERMITTENT ASTHMA (HHS-HCC): Status: RESOLVED | Noted: 2023-07-19 | Resolved: 2024-04-11

## 2024-04-11 PROBLEM — J42 CHRONIC BRONCHITIS, UNSPECIFIED CHRONIC BRONCHITIS TYPE (MULTI): Status: RESOLVED | Noted: 2023-08-01 | Resolved: 2024-04-11

## 2024-04-11 PROBLEM — T14.90XA TRAUMA, BLUNT: Status: RESOLVED | Noted: 2023-02-15 | Resolved: 2024-04-11

## 2024-04-11 PROCEDURE — 99397 PER PM REEVAL EST PAT 65+ YR: CPT | Performed by: INTERNAL MEDICINE

## 2024-04-11 PROCEDURE — 1160F RVW MEDS BY RX/DR IN RCRD: CPT | Performed by: INTERNAL MEDICINE

## 2024-04-11 PROCEDURE — 3075F SYST BP GE 130 - 139MM HG: CPT | Performed by: INTERNAL MEDICINE

## 2024-04-11 PROCEDURE — 3079F DIAST BP 80-89 MM HG: CPT | Performed by: INTERNAL MEDICINE

## 2024-04-11 PROCEDURE — 1159F MED LIST DOCD IN RCRD: CPT | Performed by: INTERNAL MEDICINE

## 2024-04-11 RX ORDER — LEVOTHYROXINE SODIUM 50 UG/1
50 TABLET ORAL DAILY
Qty: 90 TABLET | Refills: 3 | Status: SHIPPED | OUTPATIENT
Start: 2024-04-11

## 2024-04-11 ASSESSMENT — ENCOUNTER SYMPTOMS
EYES NEGATIVE: 1
GASTROINTESTINAL NEGATIVE: 1
CONSTITUTIONAL NEGATIVE: 1
NEUROLOGICAL NEGATIVE: 1
RESPIRATORY NEGATIVE: 1
MUSCULOSKELETAL NEGATIVE: 1
PSYCHIATRIC NEGATIVE: 1
ALLERGIC/IMMUNOLOGIC NEGATIVE: 1
CARDIOVASCULAR NEGATIVE: 1
HEMATOLOGIC/LYMPHATIC NEGATIVE: 1
ENDOCRINE NEGATIVE: 1

## 2024-04-11 NOTE — PROGRESS NOTES
"Subjective   Patient ID: Jhony Beyer is a 68 y.o. male who presents for Annual Exam.    Well Adult Physical   Patient here for a comprehensive physical exam.  The patient reports no problems, doing well s/p partial right TKR.  Completed PT.  Daily HEP with [employer].  Cardiac ablation last year, no further aflutter.  No new health issues.  Meds and labs reviewed.    Do you take any herbs or supplements that were not prescribed by a doctor? no   Are you taking calcium supplements? no   Are you taking aspirin daily? no    PSA: followed with urology    Colonoscopy: 4-5-2019, check 5 years       Review of Systems   Constitutional: Negative.    HENT: Negative.     Eyes: Negative.    Respiratory: Negative.     Cardiovascular: Negative.    Gastrointestinal: Negative.    Endocrine: Negative.    Genitourinary: Negative.    Musculoskeletal: Negative.    Skin: Negative.    Allergic/Immunologic: Negative.    Neurological: Negative.    Hematological: Negative.    Psychiatric/Behavioral: Negative.         Objective   /84 (BP Location: Right arm, Patient Position: Sitting)   Pulse 68   Ht 1.727 m (5' 8\")   Wt 97.1 kg (214 lb)   SpO2 96%   BMI 32.54 kg/m²     Physical Exam  Vitals and nursing note reviewed.   Constitutional:       Appearance: Normal appearance.   HENT:      Head: Normocephalic and atraumatic.      Right Ear: Tympanic membrane normal.      Left Ear: Tympanic membrane normal.      Nose: Nose normal.      Mouth/Throat:      Pharynx: Oropharynx is clear.   Eyes:      Extraocular Movements: Extraocular movements intact.      Conjunctiva/sclera: Conjunctivae normal.      Pupils: Pupils are equal, round, and reactive to light.   Cardiovascular:      Rate and Rhythm: Normal rate and regular rhythm.      Pulses: Normal pulses.      Heart sounds: Normal heart sounds.   Pulmonary:      Effort: Pulmonary effort is normal.      Breath sounds: Normal breath sounds.   Abdominal:      General: Bowel sounds are normal. "      Palpations: Abdomen is soft.   Musculoskeletal:         General: Normal range of motion.      Cervical back: Normal range of motion and neck supple.   Skin:     General: Skin is warm and dry.   Neurological:      General: No focal deficit present.      Mental Status: He is alert and oriented to person, place, and time. Mental status is at baseline.   Psychiatric:         Mood and Affect: Mood normal.         Behavior: Behavior normal.         Thought Content: Thought content normal.         Judgment: Judgment normal.       Assessment/Plan     Check fasting labs.  Lipids, LDL at goal with statin.  Recommend low fat/cholesterol diet.  Thyroid, free T4 and TSH kenneth with levothyroxine or synthroid.  Take medication 30 to 60 minutes before breakfast.  CAD, aflutter kenneth and followed with cardiology.  PSA followed with urology.  Fu with GI for colonoscopy.  Recommend diet, exercise and weight management.  Fu one year.    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

## 2024-04-19 NOTE — PROGRESS NOTES
Primary Care Physician: Jose Saleh MD  Date of Visit: 04/22/2024  3:20 PM EDT  Location of visit: [address]     Chief Complaint:   Follow up visit     HPI / Summary:   69 yo M with apical hypertrophic cardiomyopathy, CAD, NSTEMI with multivessel disease status post CABG (LIMA to LAD and in situ GUY through the transverse sinus to the ramus intermedius) on 5/22/19, hypothyroidism, hypertension, hyperlipidemia, COVID 2/2021 and parox aflutter s/p ablation 9/2023        Interval events:  Feels great.  No recurrent palpitations since ablation. Compliant with meds   in the past year and had some recent knee surgery which she is recovering from.  No chest pain shortness of breath lightheadedness dizziness presyncope or syncope.        ECHO 8/18/23: LVEF 60%, apical HCM, stage II DD, moderate LVH    Lexiscan nuclear stress 3/23: No ischemia, LVEF 51%     14 day monitor 4/2020 - no further aflutter     Stress echo 4/2020  1. The resting ejection fraction was estimated at 55 to 60% with a peak exercise ejection fraction estimated at 65 to 70%.  2. ECG portion nondiagnostic due to LVH.  3. SVT and atrial flutter noted during peak exercise. Spontaneously converted to normal sinus rhythm by 6 minutes in recovery. +palpitations.  4. No echocardiographic or clinical evidence for ischemia at a maximal workload.  5. The adequate level of stress was achieved.        Cardiac MRI 9/2019  LVEF 70%, apical hypertrophy, no significant scar burden            Past Medical History:  Past Medical History:   Diagnosis Date    COVID-19 03/25/2021    COVID-19    COVID-19 03/10/2021    Pneumonia due to COVID-19 virus    Hypoxemia 03/25/2021    Hypoxemia requiring supplemental oxygen    Idiopathic gout, left ankle and foot 01/16/2017    Acute idiopathic gout of left foot    Other conditions influencing health status 10/27/2017    History of cough    Other specified hypothyroidism 03/11/2018    Subclinical hypothyroidism     Other symptoms and signs involving the nervous system 03/25/2021    Suspected sleep apnea    Personal history of other specified conditions 05/09/2018    History of fatigue    Personal history of urinary calculi     Personal history of renal calculi    Pleurodynia 05/09/2018    Rib pain on left side    Shortness of breath 03/10/2021    SOB (shortness of breath) on exertion    Upper abdominal pain, unspecified 05/09/2018    Pain of upper abdomen        Past Surgical History:  Past Surgical History:   Procedure Laterality Date    COLONOSCOPY  10/29/2019    Colonoscopy (Fiberoptic)    MR HEAD ANGIO WO IV CONTRAST  2/11/2021    MR HEAD ANGIO WO IV CONTRAST 2/11/2021 [facility]ICAL [facility]GAC[facility]    MR NECK ANGIO WO IV CONTRAST  2/11/2021    MR NECK ANGIO WO IV CONTRAST 2/11/2021 [facility] CLINICAL LEGACY    OTHER SURGICAL HISTORY  09/04/2019    Coronary artery bypass graft    OTHER SURGICAL HISTORY  06/04/2014    Biopsy Of The Prostate Incisional    VASECTOMY  11/20/2013    Surgery Vas Deferens Vasectomy          Social History:  He reports that he has quit smoking. His smoking use included cigarettes. He has never used smokeless tobacco. He reports that he does not currently use alcohol. He reports that he does not use drugs.    Family History:  family history includes Coronary artery disease in his father; Heart disease in his mother; Lung cancer in his paternal grandmother.      Allergies:  No Known Allergies    Outpatient Medications:  Current Outpatient Medications   Medication Instructions    apixaban (ELIQUIS) 5 mg, oral, 2 times daily    levothyroxine (SYNTHROID, LEVOXYL) 50 mcg, oral, Daily    metoprolol tartrate (Lopressor) 100 mg tablet 1 tablet, oral, 2 times daily    multivitamin tablet 1 tablet, oral, Daily    nitroglycerin (NITROSTAT) 0.4 mg, sublingual, Every 5 min PRN, For 3 doses then call 911    omega-3s-dha-epa-fish oil (Sea-Omega) 200 mg-300 mg- 100 mg-1,000 mg capsule oral    rosuvastatin (CRESTOR) 40 mg, oral,  "Daily    sildenafil (Revatio) 20 mg tablet oral, Daily RT       Physical Exam:  GENERAL: alert, cooperative, pleasant, in no acute distress  SKIN: warm, dry, no rash.  NECK: no JVD, no BRUNO  CARDIAC: Regular rate and rhythm with no rubs, murmurs, or gallops  CHEST: Normal respiratory efforts, lungs clear to auscultation bilaterally.  ABDOMEN: soft, nontender, nondistended  EXTREMITIES: no edema  NEURO: Alert and oriented x 3.  Grossly normal.  Moves all 4 extremities.    Vitals:    04/22/24 1529   BP: 132/74   BP Location: Left arm   Patient Position: Sitting   Pulse: 71   Resp: 17   SpO2: 94%   Weight: 96.1 kg (211 lb 12.8 oz)   Height: 1.727 m (5' 8\")     Wt Readings from Last 5 Encounters:   04/11/24 97.1 kg (214 lb)   12/15/23 94.9 kg (209 lb 5 oz)   10/30/23 94.5 kg (208 lb 6.4 oz)   10/16/23 93.9 kg (207 lb)   08/01/23 93.4 kg (206 lb)     Body mass index is 32.2 kg/m².        Last Labs:  CMP:  Recent Labs     11/01/23  1511 07/20/23  1054 03/27/23 2003    143 CANCELED   K 4.4 4.8 CANCELED   * 109* CANCELED   CO2 25 26 CANCELED   ANIONGAP 13 13 CANCELED   BUN 17 20 CANCELED   CREATININE 1.00 1.11 CANCELED   EGFR 83  --   --    GLUCOSE 94 89 CANCELED     Recent Labs     07/20/23  1054 03/27/23  0357 05/02/22  1100 04/17/20  0612 04/16/20  1423   ALBUMIN 4.5 3.6 4.4   < > 4.2   ALKPHOS 45 45 52   < > 59   ALT 26 24 27   < > 33   AST 25 27 29   < > 24   BILITOT 0.7 0.3 0.8   < > 0.7   LIPASE  --   --   --   --  23    < > = values in this interval not displayed.     CBC:  Recent Labs     10/30/23  0959 07/20/23  1054 03/27/23  0357   WBC 7.9 6.8 5.9   HGB 17.3 16.1 14.6   HCT 51.6 50.5 42.4    201 175   MCV 92 94 89     COAG:   Recent Labs     10/30/23  0959 03/27/23  0357 02/13/21  0629   INR 1.0 1.1 1.4*   DDIMERVTE  --  319  --      ENDO:  Recent Labs     05/02/22  1100 10/11/19  1032 05/30/19  2331 05/18/19  0056   TSH 1.55 1.43 3.50 4.13*      CARDIAC:   Recent Labs     07/20/23  1054 " 03/27/23 2003 03/27/23  0751 03/27/23  0519   TROPHS  --  CANCELED CANCELED 45*   *  --   --   --      Recent Labs     05/02/22  1100 09/25/20  0829 10/11/19  1032   CHOL 139 150 137   LDLF 58 61 59   HDL 64.2 71.1 64.7   TRIG 85 89 67     No data recorded              Assessment/Plan       1. Coronary artery disease  -CABG (LIMA to LAD and in GUY through the transverse sinus to the ramus intermedius) on 5/22/19  -stable. No ischemic on stress 3/2023  -continue metoprolol, crestor 40 mg. No aspirin while on eliquis  -due for fasting lipids which are pending per PCP.     2. parox atrial flutter  -s/p ablation 9/2023  -continue eliquis, metoprolol     3. Apical variant HCM  -does not seem to be symptomatic.  -no significant NSVT on prior monitor   -no prior syncope  -No significant scar burden on MRI 9/2019  -continue metoprolol     4. Mild to moderate sleep apnea on home sleep test  no need for cpap at the moment.      Follow up ~1yr       Followup Appts:  Future Appointments   Date Time Provider Department Center   4/14/2025  8:00 AM Jose Saleh MD [id number] [facility]   4/21/2025  3:00 PM Josh Garcia DO [id number] [facility]           ____________________________________________________________  Josh Garcia DO  [facility]  [facility]

## 2024-04-22 ENCOUNTER — OFFICE VISIT (OUTPATIENT)
Dept: CARDIOLOGY | Facility: CLINIC | Age: 69
End: 2024-04-22
Payer: COMMERCIAL

## 2024-04-22 VITALS
HEART RATE: 71 BPM | RESPIRATION RATE: 17 BRPM | BODY MASS INDEX: 32.1 KG/M2 | HEIGHT: 68 IN | WEIGHT: 211.8 LBS | OXYGEN SATURATION: 94 % | SYSTOLIC BLOOD PRESSURE: 132 MMHG | DIASTOLIC BLOOD PRESSURE: 74 MMHG

## 2024-04-22 DIAGNOSIS — I42.2 APICAL VARIANT HYPERTROPHIC CARDIOMYOPATHY (MULTI): ICD-10-CM

## 2024-04-22 DIAGNOSIS — Z95.1 S/P CABG X 2: ICD-10-CM

## 2024-04-22 DIAGNOSIS — I48.3 TYPICAL ATRIAL FLUTTER (MULTI): Primary | ICD-10-CM

## 2024-04-22 PROCEDURE — 1160F RVW MEDS BY RX/DR IN RCRD: CPT | Performed by: INTERNAL MEDICINE

## 2024-04-22 PROCEDURE — 3075F SYST BP GE 130 - 139MM HG: CPT | Performed by: INTERNAL MEDICINE

## 2024-04-22 PROCEDURE — 99214 OFFICE O/P EST MOD 30 MIN: CPT | Performed by: INTERNAL MEDICINE

## 2024-04-22 PROCEDURE — 3078F DIAST BP <80 MM HG: CPT | Performed by: INTERNAL MEDICINE

## 2024-04-22 PROCEDURE — 1159F MED LIST DOCD IN RCRD: CPT | Performed by: INTERNAL MEDICINE

## 2024-04-22 PROCEDURE — 1036F TOBACCO NON-USER: CPT | Performed by: INTERNAL MEDICINE

## 2024-04-22 PROCEDURE — 1126F AMNT PAIN NOTED NONE PRSNT: CPT | Performed by: INTERNAL MEDICINE

## 2024-04-22 ASSESSMENT — COLUMBIA-SUICIDE SEVERITY RATING SCALE - C-SSRS
1. IN THE PAST MONTH, HAVE YOU WISHED YOU WERE DEAD OR WISHED YOU COULD GO TO SLEEP AND NOT WAKE UP?: NO
6. HAVE YOU EVER DONE ANYTHING, STARTED TO DO ANYTHING, OR PREPARED TO DO ANYTHING TO END YOUR LIFE?: NO
2. HAVE YOU ACTUALLY HAD ANY THOUGHTS OF KILLING YOURSELF?: NO

## 2024-04-22 ASSESSMENT — PAIN SCALES - GENERAL: PAINLEVEL: 0-NO PAIN

## 2024-07-23 ENCOUNTER — TELEPHONE (OUTPATIENT)
Dept: PRIMARY CARE | Facility: CLINIC | Age: 69
End: 2024-07-23
Payer: COMMERCIAL

## 2024-07-23 NOTE — TELEPHONE ENCOUNTER
Pt is currently fighting a sinus infection. Nasal drainage, headache,  has been ill 3-4  days ,looking to be advised ,

## 2024-08-28 DIAGNOSIS — I48.4 ATYPICAL ATRIAL FLUTTER (MULTI): Primary | ICD-10-CM

## 2024-08-28 RX ORDER — METOPROLOL TARTRATE 100 MG/1
100 TABLET ORAL 2 TIMES DAILY
Qty: 180 TABLET | Refills: 2 | Status: SHIPPED | OUTPATIENT
Start: 2024-08-28

## 2024-08-29 DIAGNOSIS — N52.9 MALE ERECTILE DISORDER OF ORGANIC ORIGIN: Primary | ICD-10-CM

## 2024-09-04 RX ORDER — SILDENAFIL 100 MG/1
100 TABLET, FILM COATED ORAL DAILY PRN
Qty: 12 TABLET | Refills: 3 | Status: SHIPPED | OUTPATIENT
Start: 2024-09-04 | End: 2025-09-04

## 2024-11-21 ENCOUNTER — OFFICE VISIT (OUTPATIENT)
Dept: PRIMARY CARE | Facility: CLINIC | Age: 69
End: 2024-11-21
Payer: COMMERCIAL

## 2024-11-21 VITALS
WEIGHT: 220 LBS | HEIGHT: 68 IN | SYSTOLIC BLOOD PRESSURE: 138 MMHG | HEART RATE: 67 BPM | DIASTOLIC BLOOD PRESSURE: 76 MMHG | OXYGEN SATURATION: 95 % | BODY MASS INDEX: 33.34 KG/M2

## 2024-11-21 DIAGNOSIS — M79.672 LEFT FOOT PAIN: Primary | ICD-10-CM

## 2024-11-21 DIAGNOSIS — J01.91 ACUTE RECURRENT SINUSITIS, UNSPECIFIED LOCATION: ICD-10-CM

## 2024-11-21 PROBLEM — I48.92 ATRIAL FLUTTER (MULTI): Status: RESOLVED | Noted: 2023-02-15 | Resolved: 2024-11-21

## 2024-11-21 PROBLEM — I48.91 A-FIB (MULTI): Status: RESOLVED | Noted: 2023-02-15 | Resolved: 2024-11-21

## 2024-11-21 PROCEDURE — 3075F SYST BP GE 130 - 139MM HG: CPT | Performed by: INTERNAL MEDICINE

## 2024-11-21 PROCEDURE — 1159F MED LIST DOCD IN RCRD: CPT | Performed by: INTERNAL MEDICINE

## 2024-11-21 PROCEDURE — 3008F BODY MASS INDEX DOCD: CPT | Performed by: INTERNAL MEDICINE

## 2024-11-21 PROCEDURE — 1160F RVW MEDS BY RX/DR IN RCRD: CPT | Performed by: INTERNAL MEDICINE

## 2024-11-21 PROCEDURE — 99214 OFFICE O/P EST MOD 30 MIN: CPT | Performed by: INTERNAL MEDICINE

## 2024-11-21 PROCEDURE — 1124F ACP DISCUSS-NO DSCNMKR DOCD: CPT | Performed by: INTERNAL MEDICINE

## 2024-11-21 PROCEDURE — 3078F DIAST BP <80 MM HG: CPT | Performed by: INTERNAL MEDICINE

## 2024-11-21 RX ORDER — AZITHROMYCIN 250 MG/1
TABLET, FILM COATED ORAL
Qty: 6 TABLET | Refills: 0 | Status: SHIPPED | OUTPATIENT
Start: 2024-11-21 | End: 2024-11-26

## 2024-11-21 RX ORDER — METHYLPREDNISOLONE 4 MG/1
TABLET ORAL
Qty: 21 TABLET | Refills: 0 | Status: SHIPPED | OUTPATIENT
Start: 2024-11-21 | End: 2024-11-28

## 2024-11-21 NOTE — PROGRESS NOTES
"Subjective   Patient ID: Jhony Beyer is a 69 y.o. male who presents for OTHER (Gout left foot) and sinus infection.    Last weekend, minor pain and swelling greater toe.  Progression of symptoms past several days.  No problems with socks or bed sheet.  Pain walking with shoes.  No change in diet.  Not much red meat.  Weight up 10 pounds past year.    Recent sinus infections with migraine exacerbation.  Last occurred 2 weeks ago.  Symptoms have resolved past week.  Discussed antibiotics needed for recurrent sinus infection.  Meds and labs reviewed.       Review of Systems   Constitutional:  Negative for fatigue.   HENT:  Positive for congestion and sinus pressure.    Respiratory:  Negative for shortness of breath.    Cardiovascular:  Negative for chest pain.   Musculoskeletal:         Foot pain.   Neurological:  Positive for headaches. Negative for dizziness.       Objective   /76 (BP Location: Right arm, Patient Position: Sitting)   Pulse 67   Ht 1.727 m (5' 8\")   Wt 99.8 kg (220 lb)   SpO2 95%   BMI 33.45 kg/m²     Physical Exam  Constitutional:       Appearance: Normal appearance.   HENT:      Right Ear: Tympanic membrane and ear canal normal.      Left Ear: Tympanic membrane and ear canal normal.      Mouth/Throat:      Mouth: Mucous membranes are moist.      Pharynx: Oropharynx is clear.   Musculoskeletal:         General: Swelling and tenderness present.   Neurological:      General: No focal deficit present.      Mental Status: He is alert.   Psychiatric:         Mood and Affect: Mood normal.         Behavior: Behavior normal.         Thought Content: Thought content normal.         Judgment: Judgment normal.         Assessment/Plan   Problem List Items Addressed This Visit             ICD-10-CM    Left foot pain - Primary M79.672     Suspect gout.  Rec uric acid.  Xrays if no improvement.  Rec medrol dose pack.           Relevant Medications    methylPREDNISolone (Medrol Dospak) 4 mg tablets    " Other Relevant Orders    Uric acid    XR foot left 3+ views    Acute recurrent sinusitis J01.91     Rec zpack for sinus infection.  Consider xrays/CT sinus if no improvement or increase in frequency/severity of symptoms.         Relevant Medications    azithromycin (Zithromax) 250 mg tablet

## 2024-11-22 ENCOUNTER — LAB (OUTPATIENT)
Dept: LAB | Facility: LAB | Age: 69
End: 2024-11-22
Payer: COMMERCIAL

## 2024-11-22 DIAGNOSIS — E03.9 ACQUIRED HYPOTHYROIDISM: ICD-10-CM

## 2024-11-22 DIAGNOSIS — Z00.00 ENCOUNTER FOR PREVENTATIVE ADULT HEALTH CARE EXAMINATION: ICD-10-CM

## 2024-11-22 DIAGNOSIS — M79.672 LEFT FOOT PAIN: ICD-10-CM

## 2024-11-22 PROBLEM — J01.91 ACUTE RECURRENT SINUSITIS: Status: ACTIVE | Noted: 2024-11-22

## 2024-11-22 LAB
ALBUMIN SERPL BCP-MCNC: 4.6 G/DL (ref 3.4–5)
ALP SERPL-CCNC: 51 U/L (ref 33–136)
ALT SERPL W P-5'-P-CCNC: 27 U/L (ref 10–52)
ANION GAP SERPL CALC-SCNC: 12 MMOL/L (ref 10–20)
AST SERPL W P-5'-P-CCNC: 24 U/L (ref 9–39)
BASOPHILS # BLD AUTO: 0.04 X10*3/UL (ref 0–0.1)
BASOPHILS NFR BLD AUTO: 0.6 %
BILIRUB SERPL-MCNC: 0.7 MG/DL (ref 0–1.2)
BUN SERPL-MCNC: 20 MG/DL (ref 6–23)
CALCIUM SERPL-MCNC: 9 MG/DL (ref 8.6–10.6)
CHLORIDE SERPL-SCNC: 109 MMOL/L (ref 98–107)
CHOLEST SERPL-MCNC: 143 MG/DL (ref 0–199)
CHOLESTEROL/HDL RATIO: 2.4
CO2 SERPL-SCNC: 24 MMOL/L (ref 21–32)
CREAT SERPL-MCNC: 0.9 MG/DL (ref 0.5–1.3)
EGFRCR SERPLBLD CKD-EPI 2021: >90 ML/MIN/1.73M*2
EOSINOPHIL # BLD AUTO: 0.12 X10*3/UL (ref 0–0.7)
EOSINOPHIL NFR BLD AUTO: 1.9 %
ERYTHROCYTE [DISTWIDTH] IN BLOOD BY AUTOMATED COUNT: 12.5 % (ref 11.5–14.5)
GLUCOSE SERPL-MCNC: 97 MG/DL (ref 74–99)
HCT VFR BLD AUTO: 48.8 % (ref 41–52)
HDLC SERPL-MCNC: 59.9 MG/DL
HGB BLD-MCNC: 16.7 G/DL (ref 13.5–17.5)
IMM GRANULOCYTES # BLD AUTO: 0.01 X10*3/UL (ref 0–0.7)
IMM GRANULOCYTES NFR BLD AUTO: 0.2 % (ref 0–0.9)
LDLC SERPL CALC-MCNC: 70 MG/DL
LYMPHOCYTES # BLD AUTO: 1.85 X10*3/UL (ref 1.2–4.8)
LYMPHOCYTES NFR BLD AUTO: 29 %
MCH RBC QN AUTO: 31.1 PG (ref 26–34)
MCHC RBC AUTO-ENTMCNC: 34.2 G/DL (ref 32–36)
MCV RBC AUTO: 91 FL (ref 80–100)
MONOCYTES # BLD AUTO: 0.57 X10*3/UL (ref 0.1–1)
MONOCYTES NFR BLD AUTO: 8.9 %
NEUTROPHILS # BLD AUTO: 3.79 X10*3/UL (ref 1.2–7.7)
NEUTROPHILS NFR BLD AUTO: 59.4 %
NON HDL CHOLESTEROL: 83 MG/DL (ref 0–149)
NRBC BLD-RTO: 0 /100 WBCS (ref 0–0)
PLATELET # BLD AUTO: 214 X10*3/UL (ref 150–450)
POTASSIUM SERPL-SCNC: 4.4 MMOL/L (ref 3.5–5.3)
PROT SERPL-MCNC: 6.7 G/DL (ref 6.4–8.2)
RBC # BLD AUTO: 5.37 X10*6/UL (ref 4.5–5.9)
SODIUM SERPL-SCNC: 141 MMOL/L (ref 136–145)
T4 FREE SERPL-MCNC: 1.09 NG/DL (ref 0.78–1.48)
TRIGL SERPL-MCNC: 66 MG/DL (ref 0–149)
TSH SERPL-ACNC: 1.37 MIU/L (ref 0.44–3.98)
URATE SERPL-MCNC: 7.2 MG/DL (ref 4–7.5)
VLDL: 13 MG/DL (ref 0–40)
WBC # BLD AUTO: 6.4 X10*3/UL (ref 4.4–11.3)

## 2024-11-22 PROCEDURE — 80053 COMPREHEN METABOLIC PANEL: CPT

## 2024-11-22 PROCEDURE — 36415 COLL VENOUS BLD VENIPUNCTURE: CPT

## 2024-11-22 PROCEDURE — 84550 ASSAY OF BLOOD/URIC ACID: CPT

## 2024-11-22 PROCEDURE — 85025 COMPLETE CBC W/AUTO DIFF WBC: CPT

## 2024-11-22 PROCEDURE — 80061 LIPID PANEL: CPT

## 2024-11-22 PROCEDURE — 84443 ASSAY THYROID STIM HORMONE: CPT

## 2024-11-22 PROCEDURE — 84439 ASSAY OF FREE THYROXINE: CPT

## 2024-11-22 ASSESSMENT — ENCOUNTER SYMPTOMS
SINUS PRESSURE: 1
HEADACHES: 1
SHORTNESS OF BREATH: 0
FATIGUE: 0
DIZZINESS: 0

## 2024-11-22 NOTE — ASSESSMENT & PLAN NOTE
Rec zpack for sinus infection.  Consider xrays/CT sinus if no improvement or increase in frequency/severity of symptoms.

## 2024-12-13 DIAGNOSIS — I48.0 PAROXYSMAL ATRIAL FIBRILLATION (MULTI): ICD-10-CM

## 2024-12-13 RX ORDER — APIXABAN 5 MG/1
5 TABLET, FILM COATED ORAL 2 TIMES DAILY
Qty: 60 TABLET | Refills: 11 | Status: SHIPPED | OUTPATIENT
Start: 2024-12-13

## 2025-02-28 DIAGNOSIS — Z95.1 S/P CABG X 2: ICD-10-CM

## 2025-02-28 DIAGNOSIS — E03.9 ACQUIRED HYPOTHYROIDISM: ICD-10-CM

## 2025-02-28 RX ORDER — ROSUVASTATIN CALCIUM 40 MG/1
40 TABLET, COATED ORAL DAILY
Qty: 90 TABLET | Refills: 3 | Status: SHIPPED | OUTPATIENT
Start: 2025-02-28

## 2025-02-28 RX ORDER — LEVOTHYROXINE SODIUM 50 UG/1
50 TABLET ORAL DAILY
Qty: 90 TABLET | Refills: 3 | Status: SHIPPED | OUTPATIENT
Start: 2025-02-28

## 2025-04-14 ENCOUNTER — APPOINTMENT (OUTPATIENT)
Dept: PRIMARY CARE | Facility: CLINIC | Age: 70
End: 2025-04-14
Payer: COMMERCIAL

## 2025-04-17 NOTE — PROGRESS NOTES
Primary Care Physician: Jose Saleh MD  Date of Visit: 04/21/2025  3:00 PM EDT  Location of visit: [address]     Chief Complaint:   Follow up visit     HPI / Summary:   70 yo M with apical hypertrophic cardiomyopathy, CAD, NSTEMI with multivessel disease status post CABG (LIMA to LAD and in situ GUY through the transverse sinus to the ramus intermedius) on 5/22/19, hypothyroidism, hypertension, hyperlipidemia, COVID 2/2021 and parox aflutter s/p ablation 9/2023        Interval events:  Overall feels great  No chest pain, pressure, shortness of breath, presyncope or syncope  Biggest frustration is difficulty losing weight.  Says diet has not changed much.  Exercises on [employer] for 6 miles several days a week.  Recently bought a place in [place] and just welcomed a new grandson.      ECG 4/21/25: NSR with HR 65 bpm, LVH with repolarization    ECHO 8/18/23: LVEF 60%, apical HCM, stage II DD, moderate LVH    Lexiscan nuclear stress 3/23: No ischemia, LVEF 51%     14 day monitor 4/2020 - no further aflutter     Stress echo 4/2020  1. The resting ejection fraction was estimated at 55 to 60% with a peak exercise ejection fraction estimated at 65 to 70%.  2. ECG portion nondiagnostic due to LVH.  3. SVT and atrial flutter noted during peak exercise. Spontaneously converted to normal sinus rhythm by 6 minutes in recovery. +palpitations.  4. No echocardiographic or clinical evidence for ischemia at a maximal workload.  5. The adequate level of stress was achieved.        Cardiac MRI 9/2019  LVEF 70%, apical hypertrophy, no significant scar burden            Past Medical History:  Past Medical History:   Diagnosis Date    COVID-19 03/25/2021    COVID-19    COVID-19 03/10/2021    Pneumonia due to COVID-19 virus    Hypoxemia 03/25/2021    Hypoxemia requiring supplemental oxygen    Idiopathic gout, left ankle and foot 01/16/2017    Acute idiopathic gout of left foot    Other conditions influencing health status  10/27/2017    History of cough    Other specified hypothyroidism 03/11/2018    Subclinical hypothyroidism    Other symptoms and signs involving the nervous system 03/25/2021    Suspected sleep apnea    Personal history of other specified conditions 05/09/2018    History of fatigue    Personal history of urinary calculi     Personal history of renal calculi    Pleurodynia 05/09/2018    Rib pain on left side    Shortness of breath 03/10/2021    SOB (shortness of breath) on exertion    Upper abdominal pain, unspecified 05/09/2018    Pain of upper abdomen        Past Surgical History:  Past Surgical History:   Procedure Laterality Date    COLONOSCOPY  10/29/2019    Colonoscopy (Fiberoptic)    MR HEAD ANGIO WO IV CONTRAST  2/11/2021    MR HEAD ANGIO WO IV CONTRAST 2/11/2021 [facility]ICAL [facility]GAC[facility]    MR NECK ANGIO WO IV CONTRAST  2/11/2021    MR NECK ANGIO WO IV CONTRAST 2/11/2021 [facility] CLINICAL LEGACY    OTHER SURGICAL HISTORY  09/04/2019    Coronary artery bypass graft    OTHER SURGICAL HISTORY  06/04/2014    Biopsy Of The Prostate Incisional    VASECTOMY  11/20/2013    Surgery Vas Deferens Vasectomy          Social History:  He reports that he has quit smoking. His smoking use included cigarettes. He has never used smokeless tobacco. He reports that he does not currently use alcohol. He reports that he does not use drugs.    Family History:  family history includes Coronary artery disease in his father; Heart disease in his mother; Lung cancer in his paternal grandmother.      Allergies:  No Known Allergies    Outpatient Medications:  Current Outpatient Medications   Medication Instructions    Eliquis 5 mg, oral, 2 times daily    levothyroxine (SYNTHROID, LEVOXYL) 50 mcg, oral, Daily    metoprolol tartrate (LOPRESSOR) 100 mg, oral, 2 times daily    multivitamin tablet 1 tablet, Daily    nitroglycerin (NITROSTAT) 0.4 mg, Every 5 min PRN    omega-3s-dha-epa-fish oil (Sea-Omega) 200 mg-300 mg- 100 mg-1,000 mg capsule Take by  mouth.    rosuvastatin (CRESTOR) 40 mg, oral, Daily    sildenafil (VIAGRA) 100 mg, oral, Daily PRN       Physical Exam:  GENERAL: alert, cooperative, pleasant, in no acute distress  SKIN: warm, dry, no rash.  NECK: no JVD, no BRUNO  CARDIAC: Regular rate and rhythm with no rubs, murmurs, or gallops  CHEST: Normal respiratory efforts, lungs clear to auscultation bilaterally.  ABDOMEN: soft, nontender, nondistended  EXTREMITIES: no edema  NEURO: Alert and oriented x 3.  Grossly normal.  Moves all 4 extremities.      Vitals:    04/21/25 1514 04/21/25 1520   BP: (!) 186/108 (!) 159/93   BP Location: Left arm Left arm   Pulse: 70    SpO2: 95%    Weight: 98.9 kg (218 lb)        Wt Readings from Last 5 Encounters:   11/21/24 99.8 kg (220 lb)   04/22/24 96.1 kg (211 lb 12.8 oz)   04/11/24 97.1 kg (214 lb)   12/15/23 94.9 kg (209 lb 5 oz)   10/30/23 94.5 kg (208 lb 6.4 oz)     Body mass index is 33.15 kg/m².        Last Labs:  CMP:  Recent Labs     11/22/24  0854 11/01/23  1511 07/20/23  1054    143 143   K 4.4 4.4 4.8   * 109* 109*   CO2 24 25 26   ANIONGAP 12 13 13   BUN 20 17 20   CREATININE 0.90 1.00 1.11   EGFR >90 83  --    GLUCOSE 97 94 89     Recent Labs     11/22/24  0854 07/20/23  1054 03/27/23  0357 04/17/20  0612 04/16/20  1423   ALBUMIN 4.6 4.5 3.6   < > 4.2   ALKPHOS 51 45 45   < > 59   ALT 27 26 24   < > 33   AST 24 25 27   < > 24   BILITOT 0.7 0.7 0.3   < > 0.7   LIPASE  --   --   --   --  23    < > = values in this interval not displayed.     CBC:  Recent Labs     11/22/24  0854 10/30/23  0959 07/20/23  1054   WBC 6.4 7.9 6.8   HGB 16.7 17.3 16.1   HCT 48.8 51.6 50.5    207 201   MCV 91 92 94     COAG:   Recent Labs     10/30/23  0959 03/27/23  0357 02/13/21  0629   INR 1.0 1.1 1.4*   DDIMERVTE  --  319  --      ENDO:  Recent Labs     11/22/24  0854 05/02/22  1100 10/11/19  1032 05/30/19  2331   TSH 1.37 1.55 1.43 3.50      CARDIAC:   Recent Labs     07/20/23  1054 03/27/23 2003  03/27/23  0751 03/27/23  0519   TROPHS  --  CANCELED CANCELED 45*   *  --   --   --      Recent Labs     11/22/24  0854 05/02/22  1100 09/25/20  0829 10/11/19  1032   CHOL 143 139 150 137   LDLF  --  58 61 59   LDLCALC 70  --   --   --    HDL 59.9 64.2 71.1 64.7   TRIG 66 85 89 67           Assessment/Plan       1. Coronary artery disease  -CABG (LIMA to LAD and in GUY through the transverse sinus to the ramus intermedius) on 5/22/19  -stable. No ischemia on stress 3/2023  -continue metoprolol, crestor 40 mg. No aspirin while on eliquis       2. parox atrial flutter  -s/p ablation 9/2023  -continue eliquis, metoprolol. Probably favor indefinite eliquis d/t history of apical HCM     3. Apical variant HCM  -does not seem to be symptomatic.  -no significant NSVT on prior monitor   -no prior syncope  -No significant scar burden on MRI 9/2019  -continue metoprolol     4.  Elevated BP.  High today but controlled at other visits.  He was rushing in for appointment today.  Suggest that he continue to follow with home BP monitoring.  Call me if consistently elevated.     Follow up ~1yr       Followup Appts:  Future Appointments   Date Time Provider Department Center   4/20/2026  2:40 PM Josh Garcia DO [id number] [facility]           ____________________________________________________________  Josh Garcia DO  [facility]  [facility]

## 2025-04-21 ENCOUNTER — OFFICE VISIT (OUTPATIENT)
Dept: CARDIOLOGY | Facility: CLINIC | Age: 70
End: 2025-04-21
Payer: COMMERCIAL

## 2025-04-21 VITALS
WEIGHT: 218 LBS | BODY MASS INDEX: 33.15 KG/M2 | OXYGEN SATURATION: 95 % | HEART RATE: 70 BPM | SYSTOLIC BLOOD PRESSURE: 159 MMHG | DIASTOLIC BLOOD PRESSURE: 93 MMHG

## 2025-04-21 DIAGNOSIS — I48.3 TYPICAL ATRIAL FLUTTER (MULTI): Primary | ICD-10-CM

## 2025-04-21 DIAGNOSIS — I42.2 APICAL VARIANT HYPERTROPHIC CARDIOMYOPATHY (MULTI): ICD-10-CM

## 2025-04-21 DIAGNOSIS — R03.0 ELEVATED BLOOD PRESSURE READING: ICD-10-CM

## 2025-04-21 DIAGNOSIS — Z95.1 S/P CABG X 2: ICD-10-CM

## 2025-04-21 PROCEDURE — 93010 ELECTROCARDIOGRAM REPORT: CPT | Performed by: INTERNAL MEDICINE

## 2025-04-21 PROCEDURE — 3077F SYST BP >= 140 MM HG: CPT | Performed by: INTERNAL MEDICINE

## 2025-04-21 PROCEDURE — G2211 COMPLEX E/M VISIT ADD ON: HCPCS | Performed by: INTERNAL MEDICINE

## 2025-04-21 PROCEDURE — 1159F MED LIST DOCD IN RCRD: CPT | Performed by: INTERNAL MEDICINE

## 2025-04-21 PROCEDURE — 99214 OFFICE O/P EST MOD 30 MIN: CPT | Performed by: INTERNAL MEDICINE

## 2025-04-21 PROCEDURE — 93005 ELECTROCARDIOGRAM TRACING: CPT | Performed by: INTERNAL MEDICINE

## 2025-04-21 PROCEDURE — 3080F DIAST BP >= 90 MM HG: CPT | Performed by: INTERNAL MEDICINE

## 2025-04-21 ASSESSMENT — COLUMBIA-SUICIDE SEVERITY RATING SCALE - C-SSRS
2. HAVE YOU ACTUALLY HAD ANY THOUGHTS OF KILLING YOURSELF?: NO
1. IN THE PAST MONTH, HAVE YOU WISHED YOU WERE DEAD OR WISHED YOU COULD GO TO SLEEP AND NOT WAKE UP?: NO
6. HAVE YOU EVER DONE ANYTHING, STARTED TO DO ANYTHING, OR PREPARED TO DO ANYTHING TO END YOUR LIFE?: NO

## 2025-04-23 LAB
ATRIAL RATE: 65 BPM
P AXIS: 54 DEGREES
P OFFSET: 178 MS
P ONSET: 116 MS
PR INTERVAL: 198 MS
Q ONSET: 215 MS
QRS COUNT: 11 BEATS
QRS DURATION: 86 MS
QT INTERVAL: 440 MS
QTC CALCULATION(BAZETT): 457 MS
QTC FREDERICIA: 451 MS
R AXIS: 3 DEGREES
T AXIS: 132 DEGREES
T OFFSET: 435 MS
VENTRICULAR RATE: 65 BPM

## 2025-06-04 DIAGNOSIS — I48.4 ATYPICAL ATRIAL FLUTTER: ICD-10-CM

## 2025-06-04 RX ORDER — METOPROLOL TARTRATE 100 MG/1
100 TABLET ORAL 2 TIMES DAILY
Qty: 180 TABLET | Refills: 3 | Status: SHIPPED | OUTPATIENT
Start: 2025-06-04

## 2025-06-23 ENCOUNTER — TELEPHONE (OUTPATIENT)
Dept: PRIMARY CARE | Facility: CLINIC | Age: 70
End: 2025-06-23
Payer: COMMERCIAL

## 2025-06-23 DIAGNOSIS — M1A.0790 IDIOPATHIC CHRONIC GOUT OF FOOT WITHOUT TOPHUS, UNSPECIFIED LATERALITY: Primary | ICD-10-CM

## 2025-06-23 RX ORDER — METHYLPREDNISOLONE 4 MG/1
TABLET ORAL
Qty: 21 TABLET | Refills: 0 | Status: SHIPPED | OUTPATIENT
Start: 2025-06-23 | End: 2025-06-29

## 2025-08-28 DIAGNOSIS — M1A.0790 CHRONIC GOUT OF FOOT, UNSPECIFIED CAUSE, UNSPECIFIED LATERALITY: Primary | ICD-10-CM

## 2025-08-28 RX ORDER — METHYLPREDNISOLONE 4 MG/1
TABLET ORAL
Qty: 21 TABLET | Refills: 0 | Status: SHIPPED | OUTPATIENT
Start: 2025-08-28 | End: 2025-09-03